# Patient Record
Sex: MALE | Race: WHITE | Employment: OTHER | URBAN - METROPOLITAN AREA
[De-identification: names, ages, dates, MRNs, and addresses within clinical notes are randomized per-mention and may not be internally consistent; named-entity substitution may affect disease eponyms.]

---

## 2017-10-27 ENCOUNTER — HOSPITAL ENCOUNTER (OUTPATIENT)
Dept: CT IMAGING | Age: 67
Discharge: HOME | End: 2017-10-27
Attending: ORTHOPAEDIC SURGERY
Payer: COMMERCIAL

## 2017-10-27 DIAGNOSIS — M17.12 DEGENERATIVE ARTHRITIS OF LEFT KNEE: ICD-10-CM

## 2017-10-27 PROCEDURE — 73700 CT LOWER EXTREMITY W/O DYE: CPT

## 2017-12-06 ENCOUNTER — HOSPITAL ENCOUNTER (OUTPATIENT)
Dept: PREADMISSION TESTING | Age: 67
Discharge: HOME | End: 2017-12-06
Payer: COMMERCIAL

## 2017-12-06 VITALS
RESPIRATION RATE: 17 BRPM | TEMPERATURE: 97.4 F | OXYGEN SATURATION: 96 % | HEIGHT: 70 IN | HEART RATE: 64 BPM | WEIGHT: 214.73 LBS | DIASTOLIC BLOOD PRESSURE: 79 MMHG | BODY MASS INDEX: 30.74 KG/M2 | SYSTOLIC BLOOD PRESSURE: 146 MMHG

## 2017-12-06 LAB
ALBUMIN SERPL-MCNC: 4.2 G/DL (ref 3.5–5)
ALBUMIN/GLOB SERPL: 1.4 {RATIO} (ref 1.1–2.2)
ALP SERPL-CCNC: 81 U/L (ref 45–117)
ALT SERPL-CCNC: 31 U/L (ref 12–78)
ANION GAP SERPL CALC-SCNC: 12 MMOL/L (ref 5–15)
APPEARANCE UR: CLEAR
AST SERPL-CCNC: 20 U/L (ref 15–37)
BACTERIA URNS QL MICRO: NEGATIVE /HPF
BASOPHILS # BLD: 0.1 K/UL (ref 0–0.1)
BASOPHILS NFR BLD: 1 % (ref 0–1)
BILIRUB SERPL-MCNC: 0.5 MG/DL (ref 0.2–1)
BILIRUB UR QL: NEGATIVE
BUN SERPL-MCNC: 18 MG/DL (ref 6–20)
BUN/CREAT SERPL: 15 (ref 12–20)
CALCIUM SERPL-MCNC: 10.3 MG/DL (ref 8.5–10.1)
CHLORIDE SERPL-SCNC: 106 MMOL/L (ref 97–108)
CO2 SERPL-SCNC: 27 MMOL/L (ref 21–32)
COLOR UR: NORMAL
CREAT SERPL-MCNC: 1.19 MG/DL (ref 0.7–1.3)
CRP SERPL-MCNC: 0.6 MG/DL
EOSINOPHIL # BLD: 0.2 K/UL (ref 0–0.4)
EOSINOPHIL NFR BLD: 3 % (ref 0–7)
EPITH CASTS URNS QL MICRO: NORMAL /LPF
ERYTHROCYTE [DISTWIDTH] IN BLOOD BY AUTOMATED COUNT: 12.9 % (ref 11.5–14.5)
ERYTHROCYTE [SEDIMENTATION RATE] IN BLOOD: 15 MM/HR (ref 0–20)
EST. AVERAGE GLUCOSE BLD GHB EST-MCNC: 128 MG/DL
GLOBULIN SER CALC-MCNC: 3 G/DL (ref 2–4)
GLUCOSE SERPL-MCNC: 95 MG/DL (ref 65–100)
GLUCOSE UR STRIP.AUTO-MCNC: NEGATIVE MG/DL
HBA1C MFR BLD: 6.1 % (ref 4.2–6.3)
HCT VFR BLD AUTO: 42.4 % (ref 36.6–50.3)
HGB BLD-MCNC: 14.8 G/DL (ref 12.1–17)
HGB UR QL STRIP: NEGATIVE
HYALINE CASTS URNS QL MICRO: NORMAL /LPF (ref 0–5)
KETONES UR QL STRIP.AUTO: NEGATIVE MG/DL
LEUKOCYTE ESTERASE UR QL STRIP.AUTO: NEGATIVE
LYMPHOCYTES # BLD: 1.6 K/UL (ref 0.8–3.5)
LYMPHOCYTES NFR BLD: 22 % (ref 12–49)
MCH RBC QN AUTO: 32.2 PG (ref 26–34)
MCHC RBC AUTO-ENTMCNC: 34.9 G/DL (ref 30–36.5)
MCV RBC AUTO: 92.2 FL (ref 80–99)
MONOCYTES # BLD: 0.7 K/UL (ref 0–1)
MONOCYTES NFR BLD: 9 % (ref 5–13)
NEUTS SEG # BLD: 4.6 K/UL (ref 1.8–8)
NEUTS SEG NFR BLD: 65 % (ref 32–75)
NITRITE UR QL STRIP.AUTO: NEGATIVE
PH UR STRIP: 5 [PH] (ref 5–8)
PLATELET # BLD AUTO: 286 K/UL (ref 150–400)
POTASSIUM SERPL-SCNC: 4.4 MMOL/L (ref 3.5–5.1)
PROT SERPL-MCNC: 7.2 G/DL (ref 6.4–8.2)
PROT UR STRIP-MCNC: NEGATIVE MG/DL
RBC # BLD AUTO: 4.6 M/UL (ref 4.1–5.7)
RBC #/AREA URNS HPF: NORMAL /HPF (ref 0–5)
SODIUM SERPL-SCNC: 145 MMOL/L (ref 136–145)
SP GR UR REFRACTOMETRY: 1.02 (ref 1–1.03)
UA: UC IF INDICATED,UAUC: NORMAL
UROBILINOGEN UR QL STRIP.AUTO: 0.2 EU/DL (ref 0.2–1)
WBC # BLD AUTO: 7.2 K/UL (ref 4.1–11.1)
WBC URNS QL MICRO: NORMAL /HPF (ref 0–4)

## 2017-12-06 PROCEDURE — 36415 COLL VENOUS BLD VENIPUNCTURE: CPT | Performed by: ORTHOPAEDIC SURGERY

## 2017-12-06 PROCEDURE — 85652 RBC SED RATE AUTOMATED: CPT | Performed by: ORTHOPAEDIC SURGERY

## 2017-12-06 PROCEDURE — 86140 C-REACTIVE PROTEIN: CPT | Performed by: ORTHOPAEDIC SURGERY

## 2017-12-06 PROCEDURE — 83036 HEMOGLOBIN GLYCOSYLATED A1C: CPT | Performed by: ORTHOPAEDIC SURGERY

## 2017-12-06 PROCEDURE — 81001 URINALYSIS AUTO W/SCOPE: CPT | Performed by: ORTHOPAEDIC SURGERY

## 2017-12-06 PROCEDURE — 93005 ELECTROCARDIOGRAM TRACING: CPT

## 2017-12-06 PROCEDURE — 85025 COMPLETE CBC W/AUTO DIFF WBC: CPT | Performed by: ORTHOPAEDIC SURGERY

## 2017-12-06 PROCEDURE — 80053 COMPREHEN METABOLIC PANEL: CPT | Performed by: ORTHOPAEDIC SURGERY

## 2017-12-06 PROCEDURE — 84466 ASSAY OF TRANSFERRIN: CPT | Performed by: ORTHOPAEDIC SURGERY

## 2017-12-06 RX ORDER — ASPIRIN 81 MG/1
81 TABLET ORAL DAILY
COMMUNITY
End: 2017-12-14

## 2017-12-06 RX ORDER — AMLODIPINE BESYLATE 10 MG/1
10 TABLET ORAL
COMMUNITY

## 2017-12-06 RX ORDER — SULINDAC 200 MG/1
200 TABLET ORAL 2 TIMES DAILY
COMMUNITY

## 2017-12-06 NOTE — PERIOP NOTES
[facility]  PREOPERATIVE INSTRUCTIONS    Surgery Date: Wednesday, 12/13/17. Surgery arrival time given by surgeon: NO  (If [facility] staff will call you between 3pm - 7pm the day before surgery with your arrival time. If your surgery is on a Monday, we will call you the preceding Friday. Please call [phone] after 7pm if you did not receive your arrival time.)   Verification phone call on Tuesday, 12/12/17. 1. Report  to the [address] [facility] on the day of your surgery. Bring your insurance card, photo identification, and any copayment (if applicable). 2. You must have a responsible adult to drive you home and stay with you the first 24 hours after surgery if you are going home the same day of your surgery. 3. Nothing to eat or drink after midnight the night before surgery. This means NO water, gum, mints, coffee, juice, etc.    4. MEDICATIONS TO TAKE THE MORNING OF SURGERY WITH A SIP OF WATER:Levothyroxine  And amlodipine. 5. No alcoholic beverages 24 hours before and after your surgery. 6. If you are being admitted to the hospital,please leave personal belongings/luggage in your car until you have an assigned hospital room number. ( The hospital discharge time is 12 PM NOON. Your adult  should be at the hospital prior to the noon discharge time unless otherwise instructed.)   7. STOP Aspirin and/or any non-steroidal anti-inflammatory drugs (i.e. Ibuprofen, Naproxen, Advil, Aleve) as directed by your surgeon. You may take Tylenol. Stop herbal supplements 1 week prior to  surgery. 8. If you are currently taking Plavix, Coumadin,or any other blood-thinning/ anticoagulant medication contact your surgeon for instructions. 9. Wear comfortable clothes. Wear your glasses instead of contacts. Please leave all money, jewelry and valuables at home. No make up, particularly mascara, the day of surgery. 10.  REMOVE ALL body piercings, rings,and jewelry and leave at home.   Wear your hair loose or down, no pony-tails, buns, or any metal hair clips. 11. If you shower the morning of surgery, please do not apply any lotions, powders, or deodorants afterwards. Do not shave any body area within 24 hours of your surgery. 12. Please follow all instructions to avoid any potential surgical cancellation. 13. Should your physical condition change, (i.e. fever, cold, flu, etc.) please notify your surgeon as soon as possible. 14. It is important to be on time. If a situation occurs where you may be delayed, please call:  [phone] / [phone] on the day of surgery. 15. The Preadmission Testing staff can be reached at [phone]. 16.  Special Instructions:  · Use Chlorhexidine Care Fusion wash and sponges 3 days prior to surgery as instructed. · Incentive spirometer given with instructions to practice at home and bring back to the hospital on the day of surgery. · [facility] will contact you if your Hemoglobin A1C is greater than 7.5. · Ensure/Glucerna  sample, nutritional information, and Ensure/Glucerna coupon given. · Pain pamphlet and Call Don't Fall reminder reviewed with patient. ·  parking is complimentary Monday - Friday 7 am - 5 pm  · Bring PTA Medication list day of surgery with the last doses taken documented   · Do not bring medication bottles the day of surgery  16. The patient was contacted  in person. He  verbalize  understanding of all instructions does not  need reinforcement.

## 2017-12-06 NOTE — PERIOP NOTES
Consent to be signed DOS . Attempted to clarify order but unsuccessful. Note on front of chart. DOS: 12/13/17.

## 2017-12-06 NOTE — PERIOP NOTES
Called Kelley Boles to clarify orders. Pt stated he was having a partial knee replacement but orders indicate full joint replacement. Left message for Kelley Boles to call back to clarify.

## 2017-12-06 NOTE — H&P
PAT Pre-Op History & Physical    Patient: Asim Farrar. MRN: [id number]          SSN: [id number]  YOB: 1950          Age: 79 y.o. Sex: male                Subjective:   Patient is a 79 y.o.  male who presents with history of chronic left knee pain that has been a problem for about 1 year per patient report. Rates his left knee pain 5/10 and describes it as an intermittent sharp, stabbing pain along medial aspect of his left knee. Walking any significant distance exacerbates his left knee pain. Sitting down relieves his pain. States that he likes to go to festival and other events and his left knee pain is keeping him from enjoying these activities. Had right TKA 10/2014 with good results. Has failed steroid joint injections, NSAIDs, Tramadol, PT, and application of ice. The patient was evaluated in the surgeon's office and it was determined that the most appropriate plan of care is to proceed with surgical intervention. Patient's PCP Rosetta Harris MD            Past Medical History:   Diagnosis Date    Arthritis     Hypercholesteremia     Hypertension     Ill-defined condition     hyperlipidemia    Ill-defined condition 12/06/2017    obesity  BMI= 30.8    Thyroid disease     Pt states he doesn't know why he is taking this medication. He stated he was never told that he had a problem. Taking this medication for 15 years. Past Surgical History:   Procedure Laterality Date    HX HEENT      eye surgery x2 -once in childhood, again in 2005    HX KNEE REPLACEMENT Right 10/2014    HX TONSILLECTOMY      child      Prior to Admission medications    Medication Sig Start Date End Date Taking? Authorizing Provider   aspirin delayed-release 81 mg tablet Take 81 mg by mouth daily. Yes Historical Provider   amLODIPine (NORVASC) 10 mg tablet Take 10 mg by mouth daily (with breakfast).    Yes Historical Provider   sulindac (CLINORIL) 200 mg tablet Take 200 mg by mouth two (2) times a day. Yes Historical Provider   tramadol (ULTRAM) 50 mg tablet Take 1 tablet by mouth every six (6) hours as needed for Pain. Patient taking differently: Take 50 mg by mouth every six (6) hours as needed for Pain. Indications: Pain 10/23/14  Yes Taya Calixto MD   losartan (COZAAR) 50 mg tablet Take 100 mg by mouth daily (after breakfast). Takes 2    50 mg tabs to = 100 mg dose. Yes Historical Provider   atorvastatin (LIPITOR) 80 mg tablet Take 80 mg by mouth Daily (before breakfast). Yes Historical Provider   levothyroxine (SYNTHROID) 125 mcg tablet Take 125 mcg by mouth Daily (before breakfast). Yes Historical Provider   ascorbic acid (VITAMIN C) 1,000 mg tablet Take 1,000 mg by mouth daily. Yes Historical Provider   multivitamins-minerals-lutein (SENIOR VITAMIN) tab tablet Take 1 Tab by mouth daily. Mature Adult  From [facility]    Historical Provider     Current Outpatient Prescriptions   Medication Sig    aspirin delayed-release 81 mg tablet Take 81 mg by mouth daily.  amLODIPine (NORVASC) 10 mg tablet Take 10 mg by mouth daily (with breakfast).  sulindac (CLINORIL) 200 mg tablet Take 200 mg by mouth two (2) times a day.  tramadol (ULTRAM) 50 mg tablet Take 1 tablet by mouth every six (6) hours as needed for Pain. (Patient taking differently: Take 50 mg by mouth every six (6) hours as needed for Pain. Indications: Pain)    losartan (COZAAR) 50 mg tablet Take 100 mg by mouth daily (after breakfast). Takes 2    50 mg tabs to = 100 mg dose.  atorvastatin (LIPITOR) 80 mg tablet Take 80 mg by mouth Daily (before breakfast).  levothyroxine (SYNTHROID) 125 mcg tablet Take 125 mcg by mouth Daily (before breakfast).  ascorbic acid (VITAMIN C) 1,000 mg tablet Take 1,000 mg by mouth daily.  multivitamins-minerals-lutein (SENIOR VITAMIN) tab tablet Take 1 Tab by mouth daily.  Mature Adult  From [facility]     No current facility-administered medications for this encounter. No Known Allergies   Social History   Substance Use Topics    Smoking status: Former Smoker     Packs/day: 2.00     Years: 12.00     Quit date: 1997    Smokeless tobacco: Never Used    Alcohol use 6.0 oz/week     6 Glasses of wine, 6 Cans of beer per week      Comment: weekly        History   Drug Use No     Family History   Problem Relation Age of Onset    Stroke Mother     Hypertension Mother     Alcohol abuse Mother     Alcohol abuse Father          Review of Systems    Patient denies difficulty swallowing, mouth sores, or loose teeth. Patient denies any recent dental procedures or any planned prior to surgery. Patient denies chest pain, tightness, pain radiating down left arm, palpitations. Denies dizziness, visual disturbances, or lightheadedness. Patient denies shortness of breath, wheezing, cough, fever, or chills. Patient denies diarrhea, constipation, or abdominal pain. Patient denies urinary problems including dysuria, hesitancy, urgency, or incontinence. Denies skin breakdown, rashes, insect bites or open area. C/o left knee pain. Objective:   Patient Vitals for the past 24 hrs:   Temp Pulse Resp BP SpO2   17 1520 97.4 °F (36.3 °C) 64 17 146/79 96 %     Temp (24hrs), Av.4 °F (36.3 °C), Min:97.4 °F (36.3 °C), Max:97.4 °F (36.3 °C)    Body mass index is 30.81 kg/(m^2). Wt Readings from Last 1 Encounters:   17 97.4 kg (214 lb 11.7 oz)        Physical Exam:     General: Pleasant,  cooperative, no apparent distress, appears stated age. Eyes: Conjunctivae/corneas clear. EOMs intact. Nose: Nares normal.   Mouth/Throat: Lips, mucosa, and tongue normal. Teeth and gums normal.   Neck: Supple, symmetrical, trachea midline. Back: Symmetric   Lungs: Clear to auscultation bilaterally. Heart: Regular rate and rhythm, S1, S2 normal. No murmur, click, rub or gallop. Abdomen: Soft, non-tender. Bowel sounds normal. No distention.    Musculoskeletal: Gait is antalgic. Extremities:  Extremities normal, atraumatic, no cyanosis or edema. Calves                                 supple, non tender to palpation. Pulses: 2+ and symmetric bilateral upper extremities. Cap. refill <2 seconds   Skin: Skin color, texture, turgor normal.  No rashes or lesions. Neurologic: CN II-XII grossly intact. Alert and oriented x3. Labs:   Recent Results (from the past 72 hour(s))   C REACTIVE PROTEIN, QT    Collection Time: 12/06/17  4:05 PM   Result Value Ref Range    C-Reactive protein 0.60 (H) <0.60 mg/dL   CBC WITH AUTOMATED DIFF    Collection Time: 12/06/17  4:05 PM   Result Value Ref Range    WBC 7.2 4.1 - 11.1 K/uL    RBC 4.60 4.10 - 5.70 M/uL    HGB 14.8 12.1 - 17.0 g/dL    HCT 42.4 36.6 - 50.3 %    MCV 92.2 80.0 - 99.0 FL    MCH 32.2 26.0 - 34.0 PG    MCHC 34.9 30.0 - 36.5 g/dL    RDW 12.9 11.5 - 14.5 %    PLATELET 940 723 - 878 K/uL    NEUTROPHILS 65 32 - 75 %    LYMPHOCYTES 22 12 - 49 %    MONOCYTES 9 5 - 13 %    EOSINOPHILS 3 0 - 7 %    BASOPHILS 1 0 - 1 %    ABS. NEUTROPHILS 4.6 1.8 - 8.0 K/UL    ABS. LYMPHOCYTES 1.6 0.8 - 3.5 K/UL    ABS. MONOCYTES 0.7 0.0 - 1.0 K/UL    ABS. EOSINOPHILS 0.2 0.0 - 0.4 K/UL    ABS. BASOPHILS 0.1 0.0 - 0.1 K/UL   METABOLIC PANEL, COMPREHENSIVE    Collection Time: 12/06/17  4:05 PM   Result Value Ref Range    Sodium 145 136 - 145 mmol/L    Potassium 4.4 3.5 - 5.1 mmol/L    Chloride 106 97 - 108 mmol/L    CO2 27 21 - 32 mmol/L    Anion gap 12 5 - 15 mmol/L    Glucose 95 65 - 100 mg/dL    BUN 18 6 - 20 MG/DL    Creatinine 1.19 0.70 - 1.30 MG/DL    BUN/Creatinine ratio 15 12 - 20      GFR est AA >60 >60 ml/min/1.73m2    GFR est non-AA >60 >60 ml/min/1.73m2    Calcium 10.3 (H) 8.5 - 10.1 MG/DL    Bilirubin, total 0.5 0.2 - 1.0 MG/DL    ALT (SGPT) 31 12 - 78 U/L    AST (SGOT) 20 15 - 37 U/L    Alk.  phosphatase 81 45 - 117 U/L    Protein, total 7.2 6.4 - 8.2 g/dL    Albumin 4.2 3.5 - 5.0 g/dL    Globulin 3.0 2.0 - 4.0 g/dL    A-G Ratio 1.4 1.1 - 2.2     HEMOGLOBIN A1C WITH EAG    Collection Time: 12/06/17  4:05 PM   Result Value Ref Range    Hemoglobin A1c 6.1 4.2 - 6.3 %    Est. average glucose 128 mg/dL   SED RATE (ESR)    Collection Time: 12/06/17  4:05 PM   Result Value Ref Range    Sed rate, automated 15 0 - 20 mm/hr   URINALYSIS W/ REFLEX CULTURE    Collection Time: 12/06/17  4:05 PM   Result Value Ref Range    Color YELLOW/STRAW      Appearance CLEAR CLEAR      Specific gravity 1.018 1.003 - 1.030      pH (UA) 5.0 5.0 - 8.0      Protein NEGATIVE  NEG mg/dL    Glucose NEGATIVE  NEG mg/dL    Ketone NEGATIVE  NEG mg/dL    Bilirubin NEGATIVE  NEG      Blood NEGATIVE  NEG      Urobilinogen 0.2 0.2 - 1.0 EU/dL    Nitrites NEGATIVE  NEG      Leukocyte Esterase NEGATIVE  NEG      WBC 0-4 0 - 4 /hpf    RBC 0-5 0 - 5 /hpf    Epithelial cells FEW FEW /lpf    Bacteria NEGATIVE  NEG /hpf    UA:UC IF INDICATED CULTURE NOT INDICATED BY UA RESULT CNI      Hyaline cast 2-5 0 - 5 /lpf       Assessment:     Left knee pain. Plan:     Scheduled for knee unicondylar replacement robotic assisted. Labs and EKG done per surgeon's orders. Labs reviewed- unremarkable except CRP= 0.60. MRSA and transferrin pending. Patient attended joint class prior to 10/2014 right TKA.         Kory Paredes NP

## 2017-12-07 LAB
ATRIAL RATE: 61 BPM
BACTERIA SPEC CULT: NORMAL
BACTERIA SPEC CULT: NORMAL
CALCULATED P AXIS, ECG09: 73 DEGREES
CALCULATED R AXIS, ECG10: 39 DEGREES
CALCULATED T AXIS, ECG11: 59 DEGREES
DIAGNOSIS, 93000: NORMAL
P-R INTERVAL, ECG05: 144 MS
Q-T INTERVAL, ECG07: 420 MS
QRS DURATION, ECG06: 96 MS
QTC CALCULATION (BEZET), ECG08: 422 MS
SERVICE CMNT-IMP: NORMAL
VENTRICULAR RATE, ECG03: 61 BPM

## 2017-12-08 LAB — TRANSFERRIN SERPL-MCNC: 268 MG/DL (ref 200–370)

## 2017-12-12 ENCOUNTER — ANESTHESIA EVENT (OUTPATIENT)
Dept: SURGERY | Age: 67
DRG: 470 | End: 2017-12-12
Payer: COMMERCIAL

## 2017-12-12 PROBLEM — M17.12 PRIMARY OSTEOARTHRITIS OF LEFT KNEE: Status: ACTIVE | Noted: 2017-12-12

## 2017-12-12 NOTE — PERIOP NOTES
Still no clarification has been obtained regarding if the patient will be undergoing a left total knee arthroplasty or a partial knee arthroplasty. Left a VM for [facility] requesting clarification of this. Also requested if the patient is to have a partial arthroplasty, that the orders be faxed to the [facility]'s fax number.   DOS: 12/13/2017

## 2017-12-13 ENCOUNTER — ANESTHESIA (OUTPATIENT)
Dept: SURGERY | Age: 67
DRG: 470 | End: 2017-12-13
Payer: COMMERCIAL

## 2017-12-13 ENCOUNTER — HOSPITAL ENCOUNTER (INPATIENT)
Age: 67
LOS: 1 days | Discharge: HOME HEALTH | DRG: 470 | End: 2017-12-14
Attending: ORTHOPAEDIC SURGERY | Admitting: ORTHOPAEDIC SURGERY
Payer: COMMERCIAL

## 2017-12-13 ENCOUNTER — APPOINTMENT (OUTPATIENT)
Dept: GENERAL RADIOLOGY | Age: 67
DRG: 470 | End: 2017-12-13
Attending: PHYSICIAN ASSISTANT
Payer: COMMERCIAL

## 2017-12-13 PROBLEM — Z96.659 S/P KNEE REPLACEMENT: Status: ACTIVE | Noted: 2017-12-13

## 2017-12-13 PROCEDURE — 77030020256 HC SOL INJ NACL 0.9%  500ML: Performed by: ORTHOPAEDIC SURGERY

## 2017-12-13 PROCEDURE — 76060000063 HC AMB SURG ANES 1.5 TO 2 HR: Performed by: ORTHOPAEDIC SURGERY

## 2017-12-13 PROCEDURE — 77030028224 HC PDNG CST BSNM -A: Performed by: ORTHOPAEDIC SURGERY

## 2017-12-13 PROCEDURE — 0SRD0L9 REPLACEMENT OF LEFT KNEE JOINT WITH MEDIAL UNICONDYLAR SYNTHETIC SUBSTITUTE, CEMENTED, OPEN APPROACH: ICD-10-PCS | Performed by: ORTHOPAEDIC SURGERY

## 2017-12-13 PROCEDURE — 64447 NJX AA&/STRD FEMORAL NRV IMG: CPT

## 2017-12-13 PROCEDURE — C1776 JOINT DEVICE (IMPLANTABLE): HCPCS | Performed by: ORTHOPAEDIC SURGERY

## 2017-12-13 PROCEDURE — 77030011640 HC PAD GRND REM COVD -A: Performed by: ORTHOPAEDIC SURGERY

## 2017-12-13 PROCEDURE — 77030018836 HC SOL IRR NACL ICUM -A: Performed by: ORTHOPAEDIC SURGERY

## 2017-12-13 PROCEDURE — 74011250637 HC RX REV CODE- 250/637: Performed by: ANESTHESIOLOGY

## 2017-12-13 PROCEDURE — 74011250637 HC RX REV CODE- 250/637: Performed by: PHYSICIAN ASSISTANT

## 2017-12-13 PROCEDURE — 8E0Y0CZ ROBOTIC ASSISTED PROCEDURE OF LOWER EXTREMITY, OPEN APPROACH: ICD-10-PCS | Performed by: ORTHOPAEDIC SURGERY

## 2017-12-13 PROCEDURE — 74011250636 HC RX REV CODE- 250/636: Performed by: PHYSICIAN ASSISTANT

## 2017-12-13 PROCEDURE — 64445 NJX AA&/STRD SCIATIC NRV IMG: CPT

## 2017-12-13 PROCEDURE — 74011250636 HC RX REV CODE- 250/636

## 2017-12-13 PROCEDURE — C1713 ANCHOR/SCREW BN/BN,TIS/BN: HCPCS | Performed by: ORTHOPAEDIC SURGERY

## 2017-12-13 PROCEDURE — 77030038021 HC TBNG IRR EMAX2 STRY -C: Performed by: ORTHOPAEDIC SURGERY

## 2017-12-13 PROCEDURE — 77030007866 HC KT SPN ANES BBMI -B

## 2017-12-13 PROCEDURE — 77030020263 HC SOL INJ SOD CL0.9% LFCR 1000ML: Performed by: ORTHOPAEDIC SURGERY

## 2017-12-13 PROCEDURE — 74011000250 HC RX REV CODE- 250

## 2017-12-13 PROCEDURE — 77030032490 HC SLV COMPR SCD KNE COVD -B: Performed by: ORTHOPAEDIC SURGERY

## 2017-12-13 PROCEDURE — 77030031139 HC SUT VCRL2 J&J -A: Performed by: ORTHOPAEDIC SURGERY

## 2017-12-13 PROCEDURE — 77030002933 HC SUT MCRYL J&J -A: Performed by: ORTHOPAEDIC SURGERY

## 2017-12-13 PROCEDURE — 77030003601 HC NDL NRV BLK BBMI -A

## 2017-12-13 PROCEDURE — 73560 X-RAY EXAM OF KNEE 1 OR 2: CPT

## 2017-12-13 PROCEDURE — 77030029820: Performed by: ORTHOPAEDIC SURGERY

## 2017-12-13 PROCEDURE — 77030018673: Performed by: ORTHOPAEDIC SURGERY

## 2017-12-13 PROCEDURE — 77030020782 HC GWN BAIR PAWS FLX 3M -B

## 2017-12-13 PROCEDURE — 74011250636 HC RX REV CODE- 250/636: Performed by: ANESTHESIOLOGY

## 2017-12-13 PROCEDURE — 77030010785: Performed by: ORTHOPAEDIC SURGERY

## 2017-12-13 PROCEDURE — 76030000047: Performed by: ORTHOPAEDIC SURGERY

## 2017-12-13 PROCEDURE — 77030000032 HC CUF TRNQT ZIMM -B: Performed by: ORTHOPAEDIC SURGERY

## 2017-12-13 PROCEDURE — 74011000258 HC RX REV CODE- 258

## 2017-12-13 PROCEDURE — 77030018822 HC SLV COMPR FT COVD -B

## 2017-12-13 PROCEDURE — 74011000272 HC RX REV CODE- 272: Performed by: ORTHOPAEDIC SURGERY

## 2017-12-13 PROCEDURE — 77030029828 HC FEM TIB CKPNT KT DISP STRY -B: Performed by: ORTHOPAEDIC SURGERY

## 2017-12-13 PROCEDURE — 65270000029 HC RM PRIVATE

## 2017-12-13 PROCEDURE — 77030033067 HC SUT PDO STRATFX SPIR J&J -B: Performed by: ORTHOPAEDIC SURGERY

## 2017-12-13 PROCEDURE — 77030038024: Performed by: ORTHOPAEDIC SURGERY

## 2017-12-13 PROCEDURE — 77030028907 HC WRP KNEE WO BGS SOLM -B

## 2017-12-13 PROCEDURE — 76210000035 HC AMBSU PH I REC 1 TO 1.5 HR: Performed by: ORTHOPAEDIC SURGERY

## 2017-12-13 PROCEDURE — 77030013708 HC HNDPC SUC IRR PULS STRY –B: Performed by: ORTHOPAEDIC SURGERY

## 2017-12-13 PROCEDURE — 74011000250 HC RX REV CODE- 250: Performed by: ORTHOPAEDIC SURGERY

## 2017-12-13 PROCEDURE — 77030010507 HC ADH SKN DERMBND J&J -B: Performed by: ORTHOPAEDIC SURGERY

## 2017-12-13 PROCEDURE — 77030011992 HC AIRWY NASOPHGL TELE -A: Performed by: ANESTHESIOLOGY

## 2017-12-13 PROCEDURE — 77030004392 HC BUR FLUT STRY -B: Performed by: ORTHOPAEDIC SURGERY

## 2017-12-13 PROCEDURE — 76030000020 HC AMB SURG 1.5 TO 2 HR INTENSV-TIER 1: Performed by: ORTHOPAEDIC SURGERY

## 2017-12-13 DEVICE — COMPONENT KNEE ONLAY 12 UNI: Type: IMPLANTABLE DEVICE | Site: KNEE | Status: FUNCTIONAL

## 2017-12-13 DEVICE — TIBIAL BEARING INSERT
Type: IMPLANTABLE DEVICE | Site: KNEE | Status: FUNCTIONAL
Brand: MAKO

## 2017-12-13 DEVICE — MCK FEMORAL COMPONENT
Type: IMPLANTABLE DEVICE | Site: KNEE | Status: FUNCTIONAL
Brand: RESTORIS

## 2017-12-13 DEVICE — CEMENT BNE SIMPLEX W/O GENT -- PK/10 ONLY: Type: IMPLANTABLE DEVICE | Site: KNEE | Status: FUNCTIONAL

## 2017-12-13 DEVICE — MCK ONLAY TIBIA BASEPLATE
Type: IMPLANTABLE DEVICE | Site: KNEE | Status: FUNCTIONAL
Brand: RESTORIS

## 2017-12-13 RX ORDER — MIDAZOLAM HYDROCHLORIDE 1 MG/ML
INJECTION, SOLUTION INTRAMUSCULAR; INTRAVENOUS AS NEEDED
Status: DISCONTINUED | OUTPATIENT
Start: 2017-12-13 | End: 2017-12-13 | Stop reason: HOSPADM

## 2017-12-13 RX ORDER — OXYCODONE HCL 10 MG/1
10 TABLET, FILM COATED, EXTENDED RELEASE ORAL EVERY 12 HOURS
Status: DISCONTINUED | OUTPATIENT
Start: 2017-12-13 | End: 2017-12-13

## 2017-12-13 RX ORDER — CEFAZOLIN SODIUM IN 0.9 % NACL 2 G/50 ML
2 INTRAVENOUS SOLUTION, PIGGYBACK (ML) INTRAVENOUS EVERY 8 HOURS
Status: COMPLETED | OUTPATIENT
Start: 2017-12-13 | End: 2017-12-14

## 2017-12-13 RX ORDER — POVIDONE-IODINE 10 %
SOLUTION, NON-ORAL TOPICAL AS NEEDED
Status: DISCONTINUED | OUTPATIENT
Start: 2017-12-13 | End: 2017-12-13 | Stop reason: HOSPADM

## 2017-12-13 RX ORDER — ONDANSETRON 8 MG/1
4 TABLET, ORALLY DISINTEGRATING ORAL
Qty: 30 TAB | Refills: 0 | Status: SHIPPED | OUTPATIENT
Start: 2017-12-13

## 2017-12-13 RX ORDER — SODIUM CHLORIDE 0.9 % (FLUSH) 0.9 %
5-10 SYRINGE (ML) INJECTION AS NEEDED
Status: DISCONTINUED | OUTPATIENT
Start: 2017-12-13 | End: 2017-12-14 | Stop reason: HOSPADM

## 2017-12-13 RX ORDER — SODIUM CHLORIDE, SODIUM LACTATE, POTASSIUM CHLORIDE, CALCIUM CHLORIDE 600; 310; 30; 20 MG/100ML; MG/100ML; MG/100ML; MG/100ML
150 INJECTION, SOLUTION INTRAVENOUS CONTINUOUS
Status: DISCONTINUED | OUTPATIENT
Start: 2017-12-13 | End: 2017-12-13 | Stop reason: HOSPADM

## 2017-12-13 RX ORDER — AMOXICILLIN 250 MG
1 CAPSULE ORAL 2 TIMES DAILY
Status: DISCONTINUED | OUTPATIENT
Start: 2017-12-13 | End: 2017-12-14 | Stop reason: HOSPADM

## 2017-12-13 RX ORDER — ACETAMINOPHEN 325 MG/1
975 TABLET ORAL ONCE
Status: COMPLETED | OUTPATIENT
Start: 2017-12-13 | End: 2017-12-13

## 2017-12-13 RX ORDER — TRAMADOL HYDROCHLORIDE 50 MG/1
50 TABLET ORAL
Qty: 60 TAB | Refills: 0 | Status: SHIPPED | OUTPATIENT
Start: 2017-12-13

## 2017-12-13 RX ORDER — FAMOTIDINE 20 MG/1
20 TABLET, FILM COATED ORAL 2 TIMES DAILY
Status: DISCONTINUED | OUTPATIENT
Start: 2017-12-13 | End: 2017-12-14 | Stop reason: HOSPADM

## 2017-12-13 RX ORDER — FENTANYL CITRATE 50 UG/ML
INJECTION, SOLUTION INTRAMUSCULAR; INTRAVENOUS AS NEEDED
Status: DISCONTINUED | OUTPATIENT
Start: 2017-12-13 | End: 2017-12-13 | Stop reason: HOSPADM

## 2017-12-13 RX ORDER — CELECOXIB 100 MG/1
200 CAPSULE ORAL 2 TIMES DAILY
Status: DISCONTINUED | OUTPATIENT
Start: 2017-12-13 | End: 2017-12-14 | Stop reason: HOSPADM

## 2017-12-13 RX ORDER — ONDANSETRON 2 MG/ML
4 INJECTION INTRAMUSCULAR; INTRAVENOUS AS NEEDED
Status: DISCONTINUED | OUTPATIENT
Start: 2017-12-13 | End: 2017-12-13 | Stop reason: ALTCHOICE

## 2017-12-13 RX ORDER — ACETAMINOPHEN 325 MG/1
650 TABLET ORAL EVERY 6 HOURS
Status: DISCONTINUED | OUTPATIENT
Start: 2017-12-13 | End: 2017-12-13 | Stop reason: SDUPTHER

## 2017-12-13 RX ORDER — ATORVASTATIN CALCIUM 20 MG/1
80 TABLET, FILM COATED ORAL
Status: DISCONTINUED | OUTPATIENT
Start: 2017-12-14 | End: 2017-12-14 | Stop reason: HOSPADM

## 2017-12-13 RX ORDER — CEFAZOLIN SODIUM IN 0.9 % NACL 2 G/50 ML
2 INTRAVENOUS SOLUTION, PIGGYBACK (ML) INTRAVENOUS ONCE
Status: COMPLETED | OUTPATIENT
Start: 2017-12-13 | End: 2017-12-13

## 2017-12-13 RX ORDER — AMLODIPINE BESYLATE 5 MG/1
10 TABLET ORAL
Status: DISCONTINUED | OUTPATIENT
Start: 2017-12-14 | End: 2017-12-14 | Stop reason: HOSPADM

## 2017-12-13 RX ORDER — LOSARTAN POTASSIUM 50 MG/1
100 TABLET ORAL
Status: DISCONTINUED | OUTPATIENT
Start: 2017-12-14 | End: 2017-12-14 | Stop reason: HOSPADM

## 2017-12-13 RX ORDER — ACETAMINOPHEN 325 MG/1
650 TABLET ORAL EVERY 6 HOURS
Status: DISCONTINUED | OUTPATIENT
Start: 2017-12-13 | End: 2017-12-14 | Stop reason: HOSPADM

## 2017-12-13 RX ORDER — PROPOFOL 10 MG/ML
INJECTION, EMULSION INTRAVENOUS
Status: DISCONTINUED | OUTPATIENT
Start: 2017-12-13 | End: 2017-12-13 | Stop reason: HOSPADM

## 2017-12-13 RX ORDER — BUPIVACAINE HYDROCHLORIDE 7.5 MG/ML
INJECTION, SOLUTION EPIDURAL; RETROBULBAR AS NEEDED
Status: DISCONTINUED | OUTPATIENT
Start: 2017-12-13 | End: 2017-12-13 | Stop reason: HOSPADM

## 2017-12-13 RX ORDER — OXYCODONE HYDROCHLORIDE 5 MG/1
5 TABLET ORAL
Status: DISCONTINUED | OUTPATIENT
Start: 2017-12-13 | End: 2017-12-14 | Stop reason: HOSPADM

## 2017-12-13 RX ORDER — ACETAMINOPHEN 500 MG
500-1000 TABLET ORAL
Qty: 60 TAB | Refills: 0 | Status: SHIPPED | OUTPATIENT
Start: 2017-12-13

## 2017-12-13 RX ORDER — HYDROMORPHONE HYDROCHLORIDE 1 MG/ML
0.5 INJECTION, SOLUTION INTRAMUSCULAR; INTRAVENOUS; SUBCUTANEOUS
Status: DISCONTINUED | OUTPATIENT
Start: 2017-12-13 | End: 2017-12-13 | Stop reason: ALTCHOICE

## 2017-12-13 RX ORDER — ASPIRIN 325 MG
325 TABLET, DELAYED RELEASE (ENTERIC COATED) ORAL 2 TIMES DAILY
Qty: 60 TAB | Refills: 0 | Status: SHIPPED | OUTPATIENT
Start: 2017-12-13

## 2017-12-13 RX ORDER — LIDOCAINE HYDROCHLORIDE 10 MG/ML
0.1 INJECTION, SOLUTION EPIDURAL; INFILTRATION; INTRACAUDAL; PERINEURAL AS NEEDED
Status: DISCONTINUED | OUTPATIENT
Start: 2017-12-13 | End: 2017-12-13 | Stop reason: HOSPADM

## 2017-12-13 RX ORDER — POLYETHYLENE GLYCOL 3350 17 G/17G
17 POWDER, FOR SOLUTION ORAL DAILY
Status: DISCONTINUED | OUTPATIENT
Start: 2017-12-14 | End: 2017-12-14 | Stop reason: HOSPADM

## 2017-12-13 RX ORDER — EPHEDRINE SULFATE 50 MG/ML
INJECTION, SOLUTION INTRAVENOUS AS NEEDED
Status: DISCONTINUED | OUTPATIENT
Start: 2017-12-13 | End: 2017-12-13 | Stop reason: HOSPADM

## 2017-12-13 RX ORDER — LIDOCAINE HYDROCHLORIDE 20 MG/ML
INJECTION, SOLUTION INFILTRATION; PERINEURAL AS NEEDED
Status: DISCONTINUED | OUTPATIENT
Start: 2017-12-13 | End: 2017-12-13 | Stop reason: HOSPADM

## 2017-12-13 RX ORDER — HYDROMORPHONE HYDROCHLORIDE 1 MG/ML
0.5 INJECTION, SOLUTION INTRAMUSCULAR; INTRAVENOUS; SUBCUTANEOUS
Status: DISCONTINUED | OUTPATIENT
Start: 2017-12-13 | End: 2017-12-14 | Stop reason: HOSPADM

## 2017-12-13 RX ORDER — OXYCODONE HYDROCHLORIDE 5 MG/1
10 TABLET ORAL
Status: DISCONTINUED | OUTPATIENT
Start: 2017-12-13 | End: 2017-12-14 | Stop reason: HOSPADM

## 2017-12-13 RX ORDER — DIPHENHYDRAMINE HYDROCHLORIDE 50 MG/ML
12.5 INJECTION, SOLUTION INTRAMUSCULAR; INTRAVENOUS AS NEEDED
Status: DISCONTINUED | OUTPATIENT
Start: 2017-12-13 | End: 2017-12-13 | Stop reason: ALTCHOICE

## 2017-12-13 RX ORDER — DIPHENHYDRAMINE HYDROCHLORIDE 50 MG/ML
12.5 INJECTION, SOLUTION INTRAMUSCULAR; INTRAVENOUS
Status: DISCONTINUED | OUTPATIENT
Start: 2017-12-13 | End: 2017-12-14 | Stop reason: HOSPADM

## 2017-12-13 RX ORDER — ASPIRIN 325 MG
325 TABLET, DELAYED RELEASE (ENTERIC COATED) ORAL 2 TIMES DAILY
Status: DISCONTINUED | OUTPATIENT
Start: 2017-12-13 | End: 2017-12-14 | Stop reason: HOSPADM

## 2017-12-13 RX ORDER — NALOXONE HYDROCHLORIDE 0.4 MG/ML
0.4 INJECTION, SOLUTION INTRAMUSCULAR; INTRAVENOUS; SUBCUTANEOUS AS NEEDED
Status: DISCONTINUED | OUTPATIENT
Start: 2017-12-13 | End: 2017-12-14 | Stop reason: HOSPADM

## 2017-12-13 RX ORDER — ALBUTEROL SULFATE 0.83 MG/ML
2.5 SOLUTION RESPIRATORY (INHALATION) AS NEEDED
Status: DISCONTINUED | OUTPATIENT
Start: 2017-12-13 | End: 2017-12-13 | Stop reason: ALTCHOICE

## 2017-12-13 RX ORDER — SODIUM CHLORIDE 9 MG/ML
125 INJECTION, SOLUTION INTRAVENOUS CONTINUOUS
Status: DISCONTINUED | OUTPATIENT
Start: 2017-12-13 | End: 2017-12-14 | Stop reason: HOSPADM

## 2017-12-13 RX ORDER — ONDANSETRON 2 MG/ML
4 INJECTION INTRAMUSCULAR; INTRAVENOUS
Status: DISCONTINUED | OUTPATIENT
Start: 2017-12-13 | End: 2017-12-14 | Stop reason: HOSPADM

## 2017-12-13 RX ORDER — SODIUM CHLORIDE 0.9 % (FLUSH) 0.9 %
5-10 SYRINGE (ML) INJECTION EVERY 8 HOURS
Status: DISCONTINUED | OUTPATIENT
Start: 2017-12-14 | End: 2017-12-14 | Stop reason: HOSPADM

## 2017-12-13 RX ORDER — PREGABALIN 75 MG/1
75 CAPSULE ORAL ONCE
Status: COMPLETED | OUTPATIENT
Start: 2017-12-13 | End: 2017-12-13

## 2017-12-13 RX ORDER — LEVOTHYROXINE SODIUM 125 UG/1
125 TABLET ORAL
Status: DISCONTINUED | OUTPATIENT
Start: 2017-12-14 | End: 2017-12-14 | Stop reason: HOSPADM

## 2017-12-13 RX ORDER — ROPIVACAINE HYDROCHLORIDE 2 MG/ML
INJECTION, SOLUTION EPIDURAL; INFILTRATION; PERINEURAL AS NEEDED
Status: DISCONTINUED | OUTPATIENT
Start: 2017-12-13 | End: 2017-12-13 | Stop reason: HOSPADM

## 2017-12-13 RX ORDER — FACIAL-BODY WIPES
10 EACH TOPICAL DAILY PRN
Status: DISCONTINUED | OUTPATIENT
Start: 2017-12-15 | End: 2017-12-14 | Stop reason: HOSPADM

## 2017-12-13 RX ORDER — OXYCODONE HYDROCHLORIDE 5 MG/1
5-10 TABLET ORAL
Qty: 70 TAB | Refills: 0 | Status: SHIPPED | OUTPATIENT
Start: 2017-12-13 | End: 2018-01-18

## 2017-12-13 RX ORDER — SODIUM CHLORIDE, SODIUM LACTATE, POTASSIUM CHLORIDE, CALCIUM CHLORIDE 600; 310; 30; 20 MG/100ML; MG/100ML; MG/100ML; MG/100ML
125 INJECTION, SOLUTION INTRAVENOUS CONTINUOUS
Status: DISCONTINUED | OUTPATIENT
Start: 2017-12-13 | End: 2017-12-13 | Stop reason: ALTCHOICE

## 2017-12-13 RX ADMIN — EPHEDRINE SULFATE 10 MG: 50 INJECTION, SOLUTION INTRAVENOUS at 13:34

## 2017-12-13 RX ADMIN — FAMOTIDINE 20 MG: 20 TABLET, FILM COATED ORAL at 18:16

## 2017-12-13 RX ADMIN — DOCUSATE SODIUM AND SENNOSIDES 1 TABLET: 8.6; 5 TABLET, FILM COATED ORAL at 18:15

## 2017-12-13 RX ADMIN — PROPOFOL 75 MCG/KG/MIN: 10 INJECTION, EMULSION INTRAVENOUS at 12:49

## 2017-12-13 RX ADMIN — BUPIVACAINE HYDROCHLORIDE 2.8 ML: 7.5 INJECTION, SOLUTION EPIDURAL; RETROBULBAR at 12:31

## 2017-12-13 RX ADMIN — MIDAZOLAM HYDROCHLORIDE 2 MG: 1 INJECTION, SOLUTION INTRAMUSCULAR; INTRAVENOUS at 12:43

## 2017-12-13 RX ADMIN — MIDAZOLAM HYDROCHLORIDE 1 MG: 1 INJECTION, SOLUTION INTRAMUSCULAR; INTRAVENOUS at 12:20

## 2017-12-13 RX ADMIN — OXYCODONE HYDROCHLORIDE 10 MG: 10 TABLET, FILM COATED, EXTENDED RELEASE ORAL at 11:04

## 2017-12-13 RX ADMIN — HYDROMORPHONE HYDROCHLORIDE 0.5 MG: 1 INJECTION, SOLUTION INTRAMUSCULAR; INTRAVENOUS; SUBCUTANEOUS at 15:47

## 2017-12-13 RX ADMIN — CELECOXIB 200 MG: 100 CAPSULE ORAL at 18:16

## 2017-12-13 RX ADMIN — MIDAZOLAM HYDROCHLORIDE 2 MG: 1 INJECTION, SOLUTION INTRAMUSCULAR; INTRAVENOUS at 12:17

## 2017-12-13 RX ADMIN — ACETAMINOPHEN 650 MG: 325 TABLET ORAL at 18:17

## 2017-12-13 RX ADMIN — SODIUM CHLORIDE, SODIUM LACTATE, POTASSIUM CHLORIDE, AND CALCIUM CHLORIDE: 600; 310; 30; 20 INJECTION, SOLUTION INTRAVENOUS at 13:34

## 2017-12-13 RX ADMIN — PREGABALIN 75 MG: 75 CAPSULE ORAL at 11:04

## 2017-12-13 RX ADMIN — ROPIVACAINE HYDROCHLORIDE 20 ML: 2 INJECTION, SOLUTION EPIDURAL; INFILTRATION; PERINEURAL at 12:24

## 2017-12-13 RX ADMIN — FENTANYL CITRATE 50 MCG: 50 INJECTION, SOLUTION INTRAMUSCULAR; INTRAVENOUS at 12:43

## 2017-12-13 RX ADMIN — ASPIRIN 325 MG: 325 TABLET, DELAYED RELEASE ORAL at 18:16

## 2017-12-13 RX ADMIN — CEFAZOLIN 2 G: 1 INJECTION, POWDER, FOR SOLUTION INTRAMUSCULAR; INTRAVENOUS; PARENTERAL at 20:22

## 2017-12-13 RX ADMIN — SODIUM CHLORIDE, SODIUM LACTATE, POTASSIUM CHLORIDE, AND CALCIUM CHLORIDE 150 ML/HR: 600; 310; 30; 20 INJECTION, SOLUTION INTRAVENOUS at 11:41

## 2017-12-13 RX ADMIN — LIDOCAINE HYDROCHLORIDE 40 MG: 20 INJECTION, SOLUTION INFILTRATION; PERINEURAL at 12:48

## 2017-12-13 RX ADMIN — ROPIVACAINE HYDROCHLORIDE 30 ML: 2 INJECTION, SOLUTION EPIDURAL; INFILTRATION; PERINEURAL at 12:21

## 2017-12-13 RX ADMIN — FENTANYL CITRATE 50 MCG: 50 INJECTION, SOLUTION INTRAMUSCULAR; INTRAVENOUS at 12:17

## 2017-12-13 RX ADMIN — SODIUM CHLORIDE 125 ML/HR: 900 INJECTION, SOLUTION INTRAVENOUS at 15:52

## 2017-12-13 RX ADMIN — ACETAMINOPHEN 975 MG: 325 TABLET ORAL at 11:04

## 2017-12-13 RX ADMIN — CEFAZOLIN 2 G: 1 INJECTION, POWDER, FOR SOLUTION INTRAMUSCULAR; INTRAVENOUS; PARENTERAL at 12:44

## 2017-12-13 NOTE — INTERVAL H&P NOTE
H&P Update:  Giovani Ciro was seen and examined. History and physical has been reviewed. The patient has been examined.  There have been no significant clinical changes since the completion of the originally dated History and Physical.    Signed By: Isacc Rodgers MD     December 13, 2017 12:47 PM

## 2017-12-13 NOTE — PROGRESS NOTES
Pt with very little B LE movement after spinal anaesthesia. Pt will be evaluated in the morning. Pt instructed in ankle pumps (when able) and handout provided.

## 2017-12-13 NOTE — IP AVS SNAPSHOT
[facility] 
 
 
 [address] 
[phone] Patient: Violetta Parrish. MRN: [id number] AOA:[id number] About your hospitalization You were admitted on:  December 13, 2017 You last received care in the:  [facility] 4M POST SURG ORT 2 You were discharged on:  December 14, 2017 Why you were hospitalized Your primary diagnosis was:  Primary Osteoarthritis Of Left Knee Your diagnoses also included:  S/P Knee Replacement Things You Need To Do (next 8 weeks) Follow up with [facility] Phone:  [phone] Where:  [facility] [address] [place] [id number] Follow up with PROVIDER UNKNOWN Where:  Patient not available to ask Discharge Orders None A check neva indicates which time of day the medication should be taken. My Medications TAKE these medications as instructed Instructions Each Dose to Equal  
 Morning Noon Evening Bedtime  
 acetaminophen 500 mg tablet Commonly known as:  80 Jr Deidre Mcneil  Your last dose was: Your next dose is: Take 1-2 Tabs by mouth every six (6) hours as needed for Pain. Not to exceed 4,000mg in any 24 hour period  Indications: Pain 500-1000 mg  
    
   
   
   
  
 amLODIPine 10 mg tablet Commonly known as:  Galdino Bliss Your last dose was: Your next dose is: Take 10 mg by mouth daily (with breakfast). 10 mg  
    
   
   
   
  
 aspirin delayed-release 325 mg tablet Your last dose was: Your next dose is: Take 1 Tab by mouth two (2) times a day. 325 mg  
    
   
   
   
  
 atorvastatin 80 mg tablet Commonly known as:  LIPITOR Your last dose was: Your next dose is: Take 80 mg by mouth Daily (before breakfast). 80 mg  
    
   
   
   
  
 losartan 50 mg tablet Commonly known as:  COZAAR  
   
 Your last dose was: Your next dose is: Take 100 mg by mouth daily (after breakfast). Takes 2    50 mg tabs to = 100 mg dose. 100 mg MULTIVITAMIN 50 PLUS Tab tablet Generic drug:  multivitamins-minerals-lutein Your last dose was: Your next dose is: Take 1 Tab by mouth daily. Mature Adult  From [employer]  
 1 Tab  
    
   
   
   
  
 ondansetron 8 mg disintegrating tablet Commonly known as:  ZOFRAN ODT Your last dose was: Your next dose is: Take 0.5 Tabs by mouth every eight (8) hours as needed for Nausea. 4 mg  
    
   
   
   
  
 oxyCODONE IR 5 mg immediate release tablet Commonly known as:  Alex Fuelamber Your last dose was: Your next dose is: Take 1-2 Tabs by mouth every four (4) hours as needed for Pain. Max Daily Amount: 60 mg. Indications: Pain 5-10 mg  
    
   
   
   
  
 sulindac 200 mg tablet Commonly known as:  CLINORIL Your last dose was: Your next dose is: Take 200 mg by mouth two (2) times a day. 200 mg SYNTHROID 125 mcg tablet Generic drug:  levothyroxine Your last dose was: Your next dose is: Take 125 mcg by mouth Daily (before breakfast). 125 mcg  
    
   
   
   
  
 traMADol 50 mg tablet Commonly known as:  ULTRAM  
   
Your last dose was: Your next dose is: Take 1 Tab by mouth every six (6) hours as needed for Pain (Take for breakthrough pain if Oxycodone is not working). Max Daily Amount: 200 mg. Indications: Pain, Post-op Pain, Diagnosis Hip and Knee Arthritis ICD 10 - M16.9  
 50 mg  
    
   
   
   
  
 VITAMIN C 1,000 mg tablet Generic drug:  ascorbic acid (vitamin C) Your last dose was: Your next dose is: Take 1,000 mg by mouth daily. 1000 mg Where to Get Your Medications Information on where to get these meds will be given to you by the nurse or doctor. ! Ask your nurse or doctor about these medications  
  acetaminophen 500 mg tablet  
 aspirin delayed-release 325 mg tablet  
 ondansetron 8 mg disintegrating tablet  
 oxyCODONE IR 5 mg immediate release tablet  
 traMADol 50 mg tablet Discharge Instructions Patient: Henna Jackson MRN: [id number]  SSN: [id number] PARTIAL KNEE REPLACEMENT DISCHARGE INSTRUCTIONS IMPORTANT NUMBERS  
 
OFFICE  
 [phone] or [phone] CELL  
(Dr Paola Gay Emergency Contact) - [phone] SPECIAL INSTRUCTIONS :  
1. Full extension at the knee is the most important aspect of your range of motion. Avoid placing a pillow or bump behind the knee. Rather, place the heel up on a bump or pillow and allow gravity to help straighten the knee. 2. You may weight bear as tolerated on the knee and during the day you should bend the knee as much as possible. 3. Drainage from the incision more than 4 days from surgery is concerning. Contact my office if there is any question [phone] ext [phone] / [phone]. There may be some drainage from the pin sites over the first day. Wound Care/ Dressing Changes: DRESSING :  
 
Aquacel Dressing : This may be removed by home health 7 days after the date of your surgery. If there is no drainage, then a simple dressing may be used or no dressing at all. Other dressing options can be purchased over the counter at a local pharmacy or medical supply vendor. A porous adhesive dressing such as pictured above can be purchased at a local [employer] or [employer]. You only need to keep the incision covered for 7 days after showers. A dressing may be used for longer if there are issues with clothing clinging to the incision. Showering/ Bathing: You may shower with the aquacel dressing in place.  This is left in place for 7 days following discharge from the hospital. If your incision is dry without drainage you may shower following your discharge home. After 7 days your aquacel dressing should be removed for showering. It is fine to have water run over the incision. Do not vigorously scrub your incision. Apply a clean, dry dressing after you have dried your incision. Do not take a bath or get into a swimming pool / [place] until you follow up with Dr. Luz Marina Oswald. Do not soak your incision under water. If there is continued drainage or you are concerned contact Dr Sue Castro office prior to showering [phone] ext [phone]. Diet: 
You may advance to your regular diet as tolerated. Increase your clear liquid intake for the next 2-3 days. Nutrition Recommendations for Discharge: 
 
Continue Oral Nutrition Supplements at discharge: Ensure Enlive or Ensure Plus 1-2 times per day 
for 30 days unless otherwise directed by your Primary Care Physician. This product can be purchased at your local grocery store or drug store and online. Rodrick Benoit, RD 
 _ Medication: 
 
 
1. You will be given prescriptions for pain medication when you are discharged from the hospital.  Please use the medications as prescribed. Pain medications may cause constipation- Colace or Milk of Magnesia may be used as needed. Other possible side effects of pain medication are dizziness, headache, nausea, vomiting, and urinary retention. Discontinue the pain medication if you develop itching, rash, shortness of breath, or difficulties swallowing. If these symptoms become severe or arent relieved by discontinuing the medication, you should seek immediate medical attention. 2. Refills of pain medication are authorized during office hours only (8 AM- 5 PM  Monday thru Friday) 3. If you were prescribed Percocet/Oxycodone, Hydrocodone / Velton Junk / [place] or Dilaudid / Hydromorphone you must have a written prescription.   These medications cannot be legally called into the pharmacy. 4. Do not take Tylenol/Acetaminophen in addition to your pain medication as most pain medications already contain acetaminophen. Do not exceed 4000mg of Tylenol/Acetaminophen per day. 5. You may resume the medication you were taking prior to your surgery. Pain medication may change the effects of any antidepressant medication you may be taking. If you have any questions about possible interactions between your regular medications and the discharge medications, you should consult the physician who prescribes your regular medications. 6. You will be discharged with prescriptions for additional pain medications (Tramadol or Toradol) and a medication for nausea and vomiting (Zofran or Phenergan). You only need to fill these prescriptions if the primary pain medication is not working or you experiencing post-op nausea. 7. Continue the Aspirin (or other agent) for DVT prophylaxis for a total of 30 days from the day of surgery. 8. If you have constipation not improved by oral stool softeners then a Ducolax suppository should be purchased over the counter. This will be more effective than any oral alternative. Follow up appointment: 
 
Esthela Jackson should be seen 5-7 days following your surgery to ensure you are improving as scheduled. If you do not already have an appointment please call our office at [phone] for your follow up appointment. Physical Therapy / Nursing: 
 
Physical Therapy following surgery will be arranged at home the day following your discharge. They have specific instructions for rehab and wound care. You can begin outpatient therapy as soon as you feel comfortable leaving the home, usually at 2 weeks following surgery. Call my office for questions or concerns.    
 
Important Signs and Symptoms: 
 
If any of the following signs or symptoms occurs, you should contact  [place] office. Please be advised if a problem arises which you feel requires immediate medical attention or you are unable to contact Dr. Michael Bailey office you should seek immediate medical attention at the ER or other health care facility you have access to. 
 
1. A sudden increase in swelling and/or redness or warmth at the area your surgery was performed which isnt relieved by rest, ice, and elevation. 2. Oral temperature greater than 101 degrees for 12 hours or more which isnt relieved by an increase in fluid intake and taking 2 Tylenol every 4-6 hours. 3. Excessive drainage from your incisions, or drainage which hasnt stopped by 72 hours after your surgery. 4. Fever, chills, shortness of breath, chest pain, nausea, vomiting or other signs and symptoms which are of concern to you. Call Dr Bj Pollard at [phone] (cell) after hours if there are any issues. During business hours contact the office at the above listed number. frequently asked questions ? What should I take for pain? 
o In general you will be discharged with three medications for pain (Extra Strength Tylenol, Oxycodone 5mg and Tramadol). This may vary slightly depending on what you were taking in the hospital.  
? 1st Line  Extra Strength Tylenol 1-2 tablets (500-1000mg) every 4-6 hrs. 
? After 2 days this dose should not exceed 8 tablets per day ? This is the first and only medication you need to take. Initially you may need 2 tablets every 4 hours, but as your pain subsides, this will taper to 1 tablet every 6 hours. ? 2nd Line - Tramadol 50mg (1 tablet) every 8 hours ? 3rd Line  Oxycodone 1 (5mg) - 2 (10mg) every 4 hours (Or as directed), take these between Percocet doses if your pain is not below 4 / 10. This may be needed only for several days following your discharge. ? 4th Line  Add Alleve 220mg every 12 hours or Motrin 400mg (200mg x 2) every 8 hours ? When should I call for advice regarding my pain? o After 12 hours on the above regimen, if nothing is working call the office ([phone] or [phone]) or call my cell after hours [phone]. 
? Can I get refills? 
o Narcotic refills are provided for the first 6 weeks following surgery. ? I will generally try to taper down to a single narcotic medication by your two week appointment. o Try Tylenol 650mg along with Alleve 220mg or Motrin 200mg during the majority of the day. ? Save the narcotic pain medications for physical therapy (1 hour prior) and before sleeping at night. ? Keep in mind you need to discontinue these medications prior to returning to driving. ? Is swelling normal? 
o Almost everyone has some degree of swelling following surgery. o Following hip and knee replacement surgery, swelling can be normal below the incision for the first 1-2 weeks. ? This swelling peaks around 5-7 days after surgery. ? You may have some bruising around the back of the thigh, calf and even into the foot. ? What should I do for the swelling? 
o Keep the limb elevated. o Apply compression socks (knee high for total knees and up to the mid-thigh for total hips.  
o Heat or ice may be applied, choose the modality that makes you the most comfortable. ? How long should I remain on blood thinners following surgery? 
o Thirty days ? When can I drive? 
o Once you have stopped using regular narcotic pain medications (Percocet, Lortab, etc.) and can safely apply the brakes without hesitation. ? When can I shower? o 72hours following surgery if the incision is dry. 
o No submersion of the incision, bathing or swimming for 14 days following surgery or until cleared by Dr Lynne Yusuf. ? What do I do with the dressing when I shower? 
o The ACE wrap can be removed in 3 days and the dressing taken down 
o The incision is sealed with Dermabond (Biologic glue) and except for wounds which are draining should be watertight. o A new dressing that covers the incision should be applied ? How active should I be following surgery? o Progress activities in moderation at your own pace.  
o Walk each day and set progressive goals with small increments (1st week  ½ block of walking, 2nd week  1 block, 3rd week  2 blocks, etc.) Please do not hesitate to call me at [phone] (cell phone) for questions following surgery - Timo Caldwell MD 
 
 
  
  
  
Introducing [facility]! Dear Josse Vo R: 
Thank you for requesting a [employer] account. Our records indicate that you already have an active [employer] account. You can access your account anytime at [email or web address] Did you know that you can access your hospital and ER discharge instructions at any time in [employer]? You can also review all of your test results from your hospital stay or ER visit. Additional Information If you have questions, please visit the Frequently Asked Questions section of the [employer] website at [email or web address] Remember, [employer] is NOT to be used for urgent needs. For medical emergencies, dial 911. Now available from your iPhone and Android! Providers Seen During Your Hospitalization Provider Specialty Primary office phone Timo Caldwell MD Orthopedic Surgery [phone] Your Primary Care Physician (PCP) Primary Care Physician Office Phone Office Fax UNKNOWN, PROVIDER ** None ** ** None ** You are allergic to the following No active allergies Recent Documentation Height Weight BMI Smoking Status 1.778 m 96.2 kg 30.43 kg/m2 Former Smoker Emergency Contacts Name Discharge Info Relation Home Work Mobile [address] CAREGIVER [3] Child [2] [phone] Patient Belongings The following personal items are in your possession at time of discharge: Dental Appliances: None  Visual Aid: Glasses, With patient      Home Medications: None   Jewelry: None  Clothing: Shirt, Sweater, Undergarments, Pants, Footwear    Other Valuables: None  Personal Items Sent to Safe: none Please provide this summary of care documentation to your next provider. Signatures-by signing, you are acknowledging that this After Visit Summary has been reviewed with you and you have received a copy. Patient Signature:  ____________________________________________________________ Date:  ____________________________________________________________  
  
[facility] Provider Signature:  ____________________________________________________________ Date:  ____________________________________________________________

## 2017-12-13 NOTE — ANESTHESIA PREPROCEDURE EVALUATION
Anesthetic History   No history of anesthetic complications            Review of Systems / Medical History  Patient summary reviewed, nursing notes reviewed and pertinent labs reviewed    Pulmonary  Within defined limits                 Neuro/Psych   Within defined limits           Cardiovascular    Hypertension          Hyperlipidemia    Exercise tolerance: >4 METS  Comments: Not on beta blocker   GI/Hepatic/Renal  Within defined limits              Endo/Other      Hypothyroidism  Arthritis     Other Findings   Comments: eye surgery x2 -once in childhood, again in 2005-strabismus         Physical Exam    Airway  Mallampati: II  TM Distance: < 4 cm  Neck ROM: normal range of motion   Mouth opening: Normal    Comments: beard Cardiovascular  Regular rate and rhythm,  S1 and S2 normal,  no murmur, click, rub, or gallop  Rhythm: regular  Rate: normal         Dental    Dentition: Bridges     Pulmonary  Breath sounds clear to auscultation               Abdominal  GI exam deferred       Other Findings            Anesthetic Plan    ASA: 2  Anesthesia type: regional and spinal - femoral single shot and popliteal fossa block            Anesthetic plan and risks discussed with: Patient

## 2017-12-13 NOTE — IP AVS SNAPSHOT
Summary of Care Report The Summary of Care report has been created to help improve care coordination. Users with access to [employer] or [address] (Web-based application) may access additional patient information including the Discharge Summary. If you are not currently a [address] user and need more information, please call the number listed below in the [place] [address] section and ask to be connected with Medical Records. Facility Information Name Address Phone [address]  [address] [facility] [address] [phone] Patient Information Patient Name Sex SUZI Jarrell. ([id number]) Male 1950 Discharge Information Admitting Provider Service Area Unit Isacc Rodgers MD / [phone] Carm[place]  [phone] Discharge Provider Discharge Date/Time Discharge Disposition Destination (none) 2017 (Pending) AHR (none) Patient Language Language ENGLISH [13] Hospital Problems as of 2017  Reviewed: 2017  4:47 PM by Danielle Melo PA-C Class Noted - Resolved Last Modified POA Active Problems * (Principal)Primary osteoarthritis of left knee  2017 - Present 2017 by Danielle Melo PA-C Yes Entered by Danielle Melo PA-C Overview Signed 2017  4:47 PM by Danielle Melo PA-C  
   LEFT UNICOMPARTMENTAL KNEE REPLACEMENT (Princella Gallegos) 17 S/P knee replacement  2017 - Present 2017 by Isacc Rodgers MD Unknown Entered by Isacc Rodgers MD  
  
Non-Hospital Problems as of 2017  Reviewed: 2017  4:47 PM by Danielle Melo PA-C Class Noted - Resolved Last Modified Active Problems Arthritis of knee  10/22/2014 - Present 10/24/2014   Entered by Alda Ibrahim MD  
 Thyroid disease  Unknown - Present 12/12/2017 by Darlyn Rothman PA-C Entered by Darlyn Rothman PA-C Overview Signed 12/12/2017  4:47 PM by Darlyn Rothman PA-C Pt states he doesn't know why he is taking this medication. He stated he was never told that he had a problem. Taking this medication for 15 years. Ill-defined condition  Unknown - Present 12/12/2017 by Darlyn Rothman PA-C Entered by Darlyn Rothman PA-C Overview Signed 12/12/2017  4:47 PM by Darlyn Rothman PA-C  
   hyperlipidemia Hypertension  Unknown - Present 12/12/2017 by Darlyn Rothman PA-C Entered by Darlyn Rothman PA-C Hypercholesteremia  Unknown - Present 12/12/2017 by Darlyn Rothman PA-C Entered by Darlyn Rothman PA-C Arthritis  Unknown - Present 12/12/2017 by Darlyn Rothman PA-C Entered by Darlyn Rothman PA-C You are allergic to the following No active allergies Current Discharge Medication List  
  
START taking these medications Dose & Instructions Dispensing Information Comments  
 acetaminophen 500 mg tablet Commonly known as:  [address]  Dose:  500-1000 mg Take 1-2 Tabs by mouth every six (6) hours as needed for Pain. Not to exceed 4,000mg in any 24 hour period  Indications: Pain Quantity:  60 Tab Refills:  0  
   
 ondansetron 8 mg disintegrating tablet Commonly known as:  ZOFRAN ODT Dose:  4 mg Take 0.5 Tabs by mouth every eight (8) hours as needed for Nausea. Quantity:  30 Tab Refills:  0  
   
 oxyCODONE IR 5 mg immediate release tablet Commonly known as:  Lisebill Warner Dose:  5-10 mg Take 1-2 Tabs by mouth every four (4) hours as needed for Pain. Max Daily Amount: 60 mg. Indications: Pain Quantity:  70 Tab Refills:  0 CONTINUE these medications which have CHANGED Dose & Instructions Dispensing Information Comments  
 aspirin delayed-release 325 mg tablet What changed:   
- medication strength 
- how much to take - when to take this Dose:  325 mg Take 1 Tab by mouth two (2) times a day. Quantity:  60 Tab Refills:  0  
   
 traMADol 50 mg tablet Commonly known as:  ULTRAM  
What changed:  reasons to take this Dose:  50 mg Take 1 Tab by mouth every six (6) hours as needed for Pain (Take for breakthrough pain if Oxycodone is not working). Max Daily Amount: 200 mg. Indications: Pain, Post-op Pain, Diagnosis Hip and Knee Arthritis ICD 10 - M16.9 Quantity:  60 Tab Refills:  0 CONTINUE these medications which have NOT CHANGED Dose & Instructions Dispensing Information Comments  
 amLODIPine 10 mg tablet Commonly known as:  Suzon Salle Dose:  10 mg Take 10 mg by mouth daily (with breakfast). Refills:  0  
   
 atorvastatin 80 mg tablet Commonly known as:  LIPITOR Dose:  80 mg Take 80 mg by mouth Daily (before breakfast). Refills:  0  
   
 losartan 50 mg tablet Commonly known as:  COZAAR Dose:  100 mg Take 100 mg by mouth daily (after breakfast). Takes 2    50 mg tabs to = 100 mg dose. Refills:  0 MULTIVITAMIN 50 PLUS Tab tablet Generic drug:  multivitamins-minerals-lutein Dose:  1 Tab Take 1 Tab by mouth daily. Mature Adult  From [employer]  
 Refills:  0  
   
 sulindac 200 mg tablet Commonly known as:  CLINORIL Dose:  200 mg Take 200 mg by mouth two (2) times a day. Refills:  0  
   
 SYNTHROID 125 mcg tablet Generic drug:  levothyroxine Dose:  125 mcg Take 125 mcg by mouth Daily (before breakfast). Refills:  0  
   
 VITAMIN C 1,000 mg tablet Generic drug:  ascorbic acid (vitamin C) Dose:  1000 mg Take 1,000 mg by mouth daily. Refills:  0 Surgery Information ID Date/Time Status Primary Surgeon All Procedures Location  [id number] 12/13/2017 Violeta Corea MD KNEE UNICONDYLAR REPLACEMENT ROBOTIC ASSISTED [facility]    
 KNEE UNICONDYLAR REPLACEMENT ROBOTIC ASSISTED:  KNEE UNICONDYLAR REPLACEMENT ROBOTIC ASSISTED Follow-up Information Follow up With Details Comments Contact Info [facility] [address] Emigdio Reece 
[phone] Provider Unknown   Patient not available to ask Discharge Instructions Patient: Evonne Ballesteros. MRN: [id number]  SSN: [id number] PARTIAL KNEE REPLACEMENT DISCHARGE INSTRUCTIONS IMPORTANT NUMBERS  
 
OFFICE  
 [phone] ext [phone] or [phone] CELL  
(Dr Anahi Moore Emergency Contact) - [phone] SPECIAL INSTRUCTIONS :  
1. Full extension at the knee is the most important aspect of your range of motion. Avoid placing a pillow or bump behind the knee. Rather, place the heel up on a bump or pillow and allow gravity to help straighten the knee. 2. You may weight bear as tolerated on the knee and during the day you should bend the knee as much as possible. 3. Drainage from the incision more than 4 days from surgery is concerning. Contact my office if there is any question [phone] ext [phone] / [phone]. There may be some drainage from the pin sites over the first day. Wound Care/ Dressing Changes: DRESSING :  
 
Aquacel Dressing : This may be removed by home health 7 days after the date of your surgery. If there is no drainage, then a simple dressing may be used or no dressing at all. Other dressing options can be purchased over the counter at a local pharmacy or medical supply vendor. A porous adhesive dressing such as pictured above can be purchased at a local [employer] or [employer]. You only need to keep the incision covered for 7 days after showers. A dressing may be used for longer if there are issues with clothing clinging to the incision. Showering/ Bathing: You may shower with the aquacel dressing in place.  This is left in place for 7 days following discharge from the hospital. If your incision is dry without drainage you may shower following your discharge home. After 7 days your aquacel dressing should be removed for showering. It is fine to have water run over the incision. Do not vigorously scrub your incision. Apply a clean, dry dressing after you have dried your incision. Do not take a bath or get into a swimming pool / [place] until you follow up with Dr. Gissel Gomes. Do not soak your incision under water. If there is continued drainage or you are concerned contact Dr Farnsworth [place] office prior to showering [phone] ext [phone]. Diet: 
You may advance to your regular diet as tolerated. Increase your clear liquid intake for the next 2-3 days. Nutrition Recommendations for Discharge: 
 
Continue Oral Nutrition Supplements at discharge: Ensure Enlive or Ensure Plus 1-2 times per day 
for 30 days unless otherwise directed by your Primary Care Physician. This product can be purchased at your local grocery store or drug store and online. Bakari Schreiber, JEAN CARLOS 
 _ Medication: 
 
 
1. You will be given prescriptions for pain medication when you are discharged from the hospital.  Please use the medications as prescribed. Pain medications may cause constipation- Colace or Milk of Magnesia may be used as needed. Other possible side effects of pain medication are dizziness, headache, nausea, vomiting, and urinary retention. Discontinue the pain medication if you develop itching, rash, shortness of breath, or difficulties swallowing. If these symptoms become severe or arent relieved by discontinuing the medication, you should seek immediate medical attention. 2. Refills of pain medication are authorized during office hours only (8 AM- 5 PM  Monday thru Friday) 3. If you were prescribed Percocet/Oxycodone, Hydrocodone / Sharlynn Gall / Lonne Alabaster or Dilaudid / Hydromorphone you must have a written prescription.   These medications cannot be legally called into the pharmacy. 4. Do not take Tylenol/Acetaminophen in addition to your pain medication as most pain medications already contain acetaminophen. Do not exceed 4000mg of Tylenol/Acetaminophen per day. 5. You may resume the medication you were taking prior to your surgery. Pain medication may change the effects of any antidepressant medication you may be taking. If you have any questions about possible interactions between your regular medications and the discharge medications, you should consult the physician who prescribes your regular medications. 6. You will be discharged with prescriptions for additional pain medications (Tramadol or Toradol) and a medication for nausea and vomiting (Zofran or Phenergan). You only need to fill these prescriptions if the primary pain medication is not working or you experiencing post-op nausea. 7. Continue the Aspirin (or other agent) for DVT prophylaxis for a total of 30 days from the day of surgery. 8. If you have constipation not improved by oral stool softeners then a Ducolax suppository should be purchased over the counter. This will be more effective than any oral alternative. Follow up appointment: 
 
Naga Galeas should be seen 5-7 days following your surgery to ensure you are improving as scheduled. If you do not already have an appointment please call our office at [phone] for your follow up appointment. Physical Therapy / Nursing: 
 
Physical Therapy following surgery will be arranged at home the day following your discharge. They have specific instructions for rehab and wound care. You can begin outpatient therapy as soon as you feel comfortable leaving the home, usually at 2 weeks following surgery. Call my office for questions or concerns.    
 
Important Signs and Symptoms: 
 
If any of the following signs or symptoms occurs, you should contact  [place] office. Please be advised if a problem arises which you feel requires immediate medical attention or you are unable to contact Dr. Dhiraj Pelayo office you should seek immediate medical attention at the ER or other health care facility you have access to. 
 
1. A sudden increase in swelling and/or redness or warmth at the area your surgery was performed which isnt relieved by rest, ice, and elevation. 2. Oral temperature greater than 101 degrees for 12 hours or more which isnt relieved by an increase in fluid intake and taking 2 Tylenol every 4-6 hours. 3. Excessive drainage from your incisions, or drainage which hasnt stopped by 72 hours after your surgery. 4. Fever, chills, shortness of breath, chest pain, nausea, vomiting or other signs and symptoms which are of concern to you. Call Dr Queenie Hendrix at [phone] (fctq) after hours if there are any issues. During business hours contact the office at the above listed number. frequently asked questions ? What should I take for pain? 
o In general you will be discharged with three medications for pain (Extra Strength Tylenol, Oxycodone 5mg and Tramadol). This may vary slightly depending on what you were taking in the hospital.  
? 1st Line  Extra Strength Tylenol 1-2 tablets (500-1000mg) every 4-6 hrs. 
? After 2 days this dose should not exceed 8 tablets per day ? This is the first and only medication you need to take. Initially you may need 2 tablets every 4 hours, but as your pain subsides, this will taper to 1 tablet every 6 hours. ? 2nd Line - Tramadol 50mg (1 tablet) every 8 hours ? 3rd Line  Oxycodone 1 (5mg) - 2 (10mg) every 4 hours (Or as directed), take these between Percocet doses if your pain is not below 4 / 10. This may be needed only for several days following your discharge. ? 4th Line  Add Alleve 220mg every 12 hours or Motrin 400mg (200mg x 2) every 8 hours ? When should I call for advice regarding my pain? o After 12 hours on the above regimen, if nothing is working call the office [phone] or [phone]) or call my cell after hours [phone]. 
? Can I get refills? 
o Narcotic refills are provided for the first 6 weeks following surgery. ? I will generally try to taper down to a single narcotic medication by your two week appointment. o Try Tylenol 650mg along with Alleve 220mg or Motrin 200mg during the majority of the day. ? Save the narcotic pain medications for physical therapy (1 hour prior) and before sleeping at night. ? Keep in mind you need to discontinue these medications prior to returning to driving. ? Is swelling normal? 
o Almost everyone has some degree of swelling following surgery. o Following hip and knee replacement surgery, swelling can be normal below the incision for the first 1-2 weeks. ? This swelling peaks around 5-7 days after surgery. ? You may have some bruising around the back of the thigh, calf and even into the foot. ? What should I do for the swelling? 
o Keep the limb elevated. o Apply compression socks (knee high for total knees and up to the mid-thigh for total hips.  
o Heat or ice may be applied, choose the modality that makes you the most comfortable. ? How long should I remain on blood thinners following surgery? 
o Thirty days ? When can I drive? 
o Once you have stopped using regular narcotic pain medications (Percocet, Lortab, etc.) and can safely apply the brakes without hesitation. ? When can I shower? o 72hours following surgery if the incision is dry. 
o No submersion of the incision, bathing or swimming for 14 days following surgery or until cleared by Dr Yu Nails. ? What do I do with the dressing when I shower? 
o The ACE wrap can be removed in 3 days and the dressing taken down 
o The incision is sealed with Dermabond (Biologic glue) and except for wounds which are draining should be watertight. o A new dressing that covers the incision should be applied ? How active should I be following surgery? o Progress activities in moderation at your own pace.  
o Walk each day and set progressive goals with small increments (1st week  ½ block of walking, 2nd week  1 block, 3rd week  2 blocks, etc.) Please do not hesitate to call me at [phone] (cell phone) for questions following surgery - Delia Chavarria MD 
 
 
Chart Review Routing History Recipient Method Report Sent By Claudia Chavarria MD  
Phone: [phone] In [facility] CUSTOM LAB REPORT Yolanda Mendieta NP [[id number]] 12/7/2017 11:22 AM 12/6/2017

## 2017-12-13 NOTE — H&P (VIEW-ONLY)
PAT Pre-Op History & Physical    Patient: Gordo Alcantara. MRN: [id number]          SSN: [id number]  YOB: 1950          Age: 79 y.o. Sex: male                Subjective:   Patient is a 79 y.o.  male who presents with history of chronic left knee pain that has been a problem for about 1 year per patient report. Rates his left knee pain 5/10 and describes it as an intermittent sharp, stabbing pain along medial aspect of his left knee. Walking any significant distance exacerbates his left knee pain. Sitting down relieves his pain. States that he likes to go to festival and other events and his left knee pain is keeping him from enjoying these activities. Had right TKA 10/2014 with good results. Has failed steroid joint injections, NSAIDs, Tramadol, PT, and application of ice. The patient was evaluated in the surgeon's office and it was determined that the most appropriate plan of care is to proceed with surgical intervention. Patient's PCP Sunita Choudhury MD            Past Medical History:   Diagnosis Date    Arthritis     Hypercholesteremia     Hypertension     Ill-defined condition     hyperlipidemia    Ill-defined condition 12/06/2017    obesity  BMI= 30.8    Thyroid disease     Pt states he doesn't know why he is taking this medication. He stated he was never told that he had a problem. Taking this medication for 15 years. Past Surgical History:   Procedure Laterality Date    HX HEENT      eye surgery x2 -once in childhood, again in 2005    HX KNEE REPLACEMENT Right 10/2014    HX TONSILLECTOMY      child      Prior to Admission medications    Medication Sig Start Date End Date Taking? Authorizing Provider   aspirin delayed-release 81 mg tablet Take 81 mg by mouth daily. Yes Historical Provider   amLODIPine (NORVASC) 10 mg tablet Take 10 mg by mouth daily (with breakfast).    Yes Historical Provider   sulindac (CLINORIL) 200 mg tablet Take 200 mg by mouth two (2) times a day. Yes Historical Provider   tramadol (ULTRAM) 50 mg tablet Take 1 tablet by mouth every six (6) hours as needed for Pain. Patient taking differently: Take 50 mg by mouth every six (6) hours as needed for Pain. Indications: Pain 10/23/14  Yes Taya Calixto MD   losartan (COZAAR) 50 mg tablet Take 100 mg by mouth daily (after breakfast). Takes 2    50 mg tabs to = 100 mg dose. Yes Historical Provider   atorvastatin (LIPITOR) 80 mg tablet Take 80 mg by mouth Daily (before breakfast). Yes Historical Provider   levothyroxine (SYNTHROID) 125 mcg tablet Take 125 mcg by mouth Daily (before breakfast). Yes Historical Provider   ascorbic acid (VITAMIN C) 1,000 mg tablet Take 1,000 mg by mouth daily. Yes Historical Provider   multivitamins-minerals-lutein (SENIOR VITAMIN) tab tablet Take 1 Tab by mouth daily. Mature Adult  From [facility]    Historical Provider     Current Outpatient Prescriptions   Medication Sig    aspirin delayed-release 81 mg tablet Take 81 mg by mouth daily.  amLODIPine (NORVASC) 10 mg tablet Take 10 mg by mouth daily (with breakfast).  sulindac (CLINORIL) 200 mg tablet Take 200 mg by mouth two (2) times a day.  tramadol (ULTRAM) 50 mg tablet Take 1 tablet by mouth every six (6) hours as needed for Pain. (Patient taking differently: Take 50 mg by mouth every six (6) hours as needed for Pain. Indications: Pain)    losartan (COZAAR) 50 mg tablet Take 100 mg by mouth daily (after breakfast). Takes 2    50 mg tabs to = 100 mg dose.  atorvastatin (LIPITOR) 80 mg tablet Take 80 mg by mouth Daily (before breakfast).  levothyroxine (SYNTHROID) 125 mcg tablet Take 125 mcg by mouth Daily (before breakfast).  ascorbic acid (VITAMIN C) 1,000 mg tablet Take 1,000 mg by mouth daily.  multivitamins-minerals-lutein (SENIOR VITAMIN) tab tablet Take 1 Tab by mouth daily.  Mature Adult  From [facility]     No current facility-administered medications for this encounter. No Known Allergies   Social History   Substance Use Topics    Smoking status: Former Smoker     Packs/day: 2.00     Years: 12.00     Quit date: 1997    Smokeless tobacco: Never Used    Alcohol use 6.0 oz/week     6 Glasses of wine, 6 Cans of beer per week      Comment: weekly        History   Drug Use No     Family History   Problem Relation Age of Onset    Stroke Mother     Hypertension Mother     Alcohol abuse Mother     Alcohol abuse Father          Review of Systems    Patient denies difficulty swallowing, mouth sores, or loose teeth. Patient denies any recent dental procedures or any planned prior to surgery. Patient denies chest pain, tightness, pain radiating down left arm, palpitations. Denies dizziness, visual disturbances, or lightheadedness. Patient denies shortness of breath, wheezing, cough, fever, or chills. Patient denies diarrhea, constipation, or abdominal pain. Patient denies urinary problems including dysuria, hesitancy, urgency, or incontinence. Denies skin breakdown, rashes, insect bites or open area. C/o left knee pain. Objective:   Patient Vitals for the past 24 hrs:   Temp Pulse Resp BP SpO2   17 1520 97.4 °F (36.3 °C) 64 17 146/79 96 %     Temp (24hrs), Av.4 °F (36.3 °C), Min:97.4 °F (36.3 °C), Max:97.4 °F (36.3 °C)    Body mass index is 30.81 kg/(m^2). Wt Readings from Last 1 Encounters:   17 97.4 kg (214 lb 11.7 oz)        Physical Exam:     General: Pleasant,  cooperative, no apparent distress, appears stated age. Eyes: Conjunctivae/corneas clear. EOMs intact. Nose: Nares normal.   Mouth/Throat: Lips, mucosa, and tongue normal. Teeth and gums normal.   Neck: Supple, symmetrical, trachea midline. Back: Symmetric   Lungs: Clear to auscultation bilaterally. Heart: Regular rate and rhythm, S1, S2 normal. No murmur, click, rub or gallop. Abdomen: Soft, non-tender. Bowel sounds normal. No distention.    Musculoskeletal: Gait is antalgic. Extremities:  Extremities normal, atraumatic, no cyanosis or edema. Calves                                 supple, non tender to palpation. Pulses: 2+ and symmetric bilateral upper extremities. Cap. refill <2 seconds   Skin: Skin color, texture, turgor normal.  No rashes or lesions. Neurologic: CN II-XII grossly intact. Alert and oriented x3. Labs:   Recent Results (from the past 72 hour(s))   C REACTIVE PROTEIN, QT    Collection Time: 12/06/17  4:05 PM   Result Value Ref Range    C-Reactive protein 0.60 (H) <0.60 mg/dL   CBC WITH AUTOMATED DIFF    Collection Time: 12/06/17  4:05 PM   Result Value Ref Range    WBC 7.2 4.1 - 11.1 K/uL    RBC 4.60 4.10 - 5.70 M/uL    HGB 14.8 12.1 - 17.0 g/dL    HCT 42.4 36.6 - 50.3 %    MCV 92.2 80.0 - 99.0 FL    MCH 32.2 26.0 - 34.0 PG    MCHC 34.9 30.0 - 36.5 g/dL    RDW 12.9 11.5 - 14.5 %    PLATELET 136 477 - 150 K/uL    NEUTROPHILS 65 32 - 75 %    LYMPHOCYTES 22 12 - 49 %    MONOCYTES 9 5 - 13 %    EOSINOPHILS 3 0 - 7 %    BASOPHILS 1 0 - 1 %    ABS. NEUTROPHILS 4.6 1.8 - 8.0 K/UL    ABS. LYMPHOCYTES 1.6 0.8 - 3.5 K/UL    ABS. MONOCYTES 0.7 0.0 - 1.0 K/UL    ABS. EOSINOPHILS 0.2 0.0 - 0.4 K/UL    ABS. BASOPHILS 0.1 0.0 - 0.1 K/UL   METABOLIC PANEL, COMPREHENSIVE    Collection Time: 12/06/17  4:05 PM   Result Value Ref Range    Sodium 145 136 - 145 mmol/L    Potassium 4.4 3.5 - 5.1 mmol/L    Chloride 106 97 - 108 mmol/L    CO2 27 21 - 32 mmol/L    Anion gap 12 5 - 15 mmol/L    Glucose 95 65 - 100 mg/dL    BUN 18 6 - 20 MG/DL    Creatinine 1.19 0.70 - 1.30 MG/DL    BUN/Creatinine ratio 15 12 - 20      GFR est AA >60 >60 ml/min/1.73m2    GFR est non-AA >60 >60 ml/min/1.73m2    Calcium 10.3 (H) 8.5 - 10.1 MG/DL    Bilirubin, total 0.5 0.2 - 1.0 MG/DL    ALT (SGPT) 31 12 - 78 U/L    AST (SGOT) 20 15 - 37 U/L    Alk.  phosphatase 81 45 - 117 U/L    Protein, total 7.2 6.4 - 8.2 g/dL    Albumin 4.2 3.5 - 5.0 g/dL    Globulin 3.0 2.0 - 4.0 g/dL    A-G Ratio 1.4 1.1 - 2.2     HEMOGLOBIN A1C WITH EAG    Collection Time: 12/06/17  4:05 PM   Result Value Ref Range    Hemoglobin A1c 6.1 4.2 - 6.3 %    Est. average glucose 128 mg/dL   SED RATE (ESR)    Collection Time: 12/06/17  4:05 PM   Result Value Ref Range    Sed rate, automated 15 0 - 20 mm/hr   URINALYSIS W/ REFLEX CULTURE    Collection Time: 12/06/17  4:05 PM   Result Value Ref Range    Color YELLOW/STRAW      Appearance CLEAR CLEAR      Specific gravity 1.018 1.003 - 1.030      pH (UA) 5.0 5.0 - 8.0      Protein NEGATIVE  NEG mg/dL    Glucose NEGATIVE  NEG mg/dL    Ketone NEGATIVE  NEG mg/dL    Bilirubin NEGATIVE  NEG      Blood NEGATIVE  NEG      Urobilinogen 0.2 0.2 - 1.0 EU/dL    Nitrites NEGATIVE  NEG      Leukocyte Esterase NEGATIVE  NEG      WBC 0-4 0 - 4 /hpf    RBC 0-5 0 - 5 /hpf    Epithelial cells FEW FEW /lpf    Bacteria NEGATIVE  NEG /hpf    UA:UC IF INDICATED CULTURE NOT INDICATED BY UA RESULT CNI      Hyaline cast 2-5 0 - 5 /lpf       Assessment:     Left knee pain. Plan:     Scheduled for knee unicondylar replacement robotic assisted. Labs and EKG done per surgeon's orders. Labs reviewed- unremarkable except CRP= 0.60. MRSA and transferrin pending. Patient attended joint class prior to 10/2014 right TKA.         Franko Fatima NP

## 2017-12-13 NOTE — ANESTHESIA PROCEDURE NOTES
Peripheral Block    Start time: 12/13/2017 12:17 PM  End time: 12/13/2017 12:24 PM  Performed by: Monse Hall  Authorized by: Lior Roberto       Pre-procedure: Indications: at surgeon's request and post-op pain management    Preanesthetic Checklist: patient identified, risks and benefits discussed, site marked, timeout performed, anesthesia consent given and patient being monitored    Timeout Time: 12:17          Block Type:   Block Type:  Popliteal and femoral single shot  Laterality:  Left  Monitoring:  Continuous pulse ox, frequent vital sign checks, heart rate and responsive to questions  Injection Technique:  Single shot  Procedures: ultrasound guided and nerve stimulator    Patient Position: supine  Prep: chlorhexidine    Location:  Upper thigh  Needle Type:  Stimuplex  Needle Gauge:  22 G  Needle Localization:  Nerve stimulator and ultrasound guidance  Medication Injected:  0.2%  ropivacaine  Volume (mL):  30    Assessment:  Number of attempts:  1  Injection Assessment:  Incremental injection every 5 mL, local visualized surrounding nerve on ultrasound, negative aspiration for blood, no paresthesia and no intravascular symptoms  Patient tolerance:  Patient tolerated the procedure well with no immediate complications  Popliteal block done under ultrasound guidance and nerve stimulation with incremental injection of Ropivacaine 0.2% 20 cc using a 21 G Stimuplex needle.

## 2017-12-13 NOTE — ROUTINE PROCESS
TRANSFER - OUT REPORT:    Verbal report given to Cassy Ruff RN on [facility] .  being transferred to [facility] [phone] for routine post - op       Report consisted of patients Situation, Background, Assessment and   Recommendations(SBAR). Information from the following report(s) SBAR was reviewed with the receiving nurse. Lines:   Peripheral IV 12/13/17 Left Forearm (Active)   Site Assessment Clean, dry, & intact 12/13/2017  4:11 PM   Phlebitis Assessment 0 12/13/2017  4:11 PM   Infiltration Assessment 0 12/13/2017  4:11 PM   Dressing Status Clean, dry, & intact 12/13/2017  4:11 PM   Dressing Type Transparent 12/13/2017  4:11 PM   Hub Color/Line Status Green; Infusing 12/13/2017  4:11 PM   Alcohol Cap Used Yes 12/13/2017 11:09 AM        Opportunity for questions and clarification was provided.       Patient transported with:   Registered Nurse

## 2017-12-13 NOTE — OP NOTES
OPERATIVE REPORT     Admit Date: 12/13/2017  Admit Diagnosis: LEFT KNEE PAIN  Primary osteoarthritis of left knee    Date of Procedure: 12/13/2017   Preoperative Diagnosis: LEFT KNEE PAIN  Postoperative Diagnosis: * No post-op diagnosis entered *    Procedure: Procedure(s):  KNEE UNICONDYLAR REPLACEMENT ROBOTIC ASSISTED  Surgeon: Taya Calixto MD  Assistant(s): Tricia Millan PA-C  Anesthesia: Regional   Estimated Blood Loss: 100cc  Specimens: * No specimens in log *   Complications: None           INDICATIONS:   The patient is a 79 y.o., male who has complained of a long history of knee pain. The patient  has failed conservative treatment and presents for definitive operative care. Informed consent obtained including a discussion of the risks and benefits, which include, but are not limited to, bleeding, infection, neurovascular damage, wound complications, pain and stiffness in the knee, periprosthetic loosening, fracture dislocation and DVT, the patient consented for the procedure. DESCRIPTION OF PROCEDURE:   The patient was seen in the preoperative holding area. The patient was positively identified. The limb was initialed,  questions were answered. The patient was subsequently taken to the operating room. The patient underwent   general endotracheal anesthesia. The patient was positioned in the supine   position. All bony prominences were well padded. The limb was prepped and   draped in a sterile fashion. The appropriate pause for safety was performed. Steinmen pins were placed for the femoral array (one hand breadth proximal to the superior pole of the patella) and tibial array (one hand breadth distal to the tibial tubercle). The skin at these areas was injected with saline and epinepherine to decrease bleeding. The hip center or rotation was established. Utilizing an incision from the superior pole of the patella distally to the tibial tubercle.  The incision was taken down through the skin and subcutaneous tissue until the retinaculum could be identified. This was sharply incised utilizing a medial parapatellar incision. This was taken   down, and care was taken not to damage the trochlea. The medial-based soft   tissue was retracted as well as the infrapatellar fat pad. The ACL was   noted to be intact. The femoral and tibial trackers were then placed. Registration was performed on the femur and tibia. The knee was taken through a complete range of motion with applied varus force with 6 data points. Implant position and alignment was verified. Once this was complete the MAKOplasty robot was positioned. Burring was then performed for the femur and then the tibia. Trial implants were placed and range of motion along with overall fit was established with very good integrity of the MCL noted. Bony osteophytes were removed and the meniscal rim was removed. The knee was injected with 60cc of Morphine / Ketoralac and Lidocaine. The knee was then exsanguinated and the trial implants were removed. Subsequently, all the bony surfaces were irrigated. Cement was mixed, and   the final components were implanted. The femur was placed first, then the tibia. Care   was taken to get the posterior tibial cement from behind the tray,  and care was used to remove all cement. The final poly ethylene was placed. The wound was copiously irrigated with normal saline, and then the final polyethylene was implanted. The knee was very stable after it was taken through a full range of motion. The wound was closed with 0 Vicryl and then number 2 Quill proximally. Once this had been completed, the skin was closed with 2-0 Vicryl   4-0 monocryl suture and Dermabond. The tibial and femoral incisions were closed with 4-0 Monocryl. A sterile dressing was applied. The patient was taken from the operating room in stable condition. OPERATIVE FINDINGS : Severe Varus OA    IMPLANTS :     Implant Name Type Inv.  Item Serial No.  Lot No. LRB No. Used Action   CEMENT BNE SIMPLEX W/O GENT -- PK/10 ONLY - SNA  CEMENT BNE SIMPLEX W/O GENT -- PK/10 ONLY NA [employer] HOW [id number] Left 1 Implanted   IMPLANT RECORD             1 Implanted       JUSTIFICATION FOR SURGICAL ASSISTANT:   Surgical Assistant, was requried and necessary in this case, to help with soft tissue retraction, extremity positioning, equiment management, implant management, and wound closure.     Kali Auguste MD

## 2017-12-13 NOTE — IP AVS SNAPSHOT
[address] 
[phone] Patient: Debra Nina. MRN: [id number] EGF:[id number] My Medications TAKE these medications as instructed Instructions Each Dose to Equal  
 Morning Noon Evening Bedtime  
 acetaminophen 500 mg tablet Commonly known as:  [facility] Your last dose was: Your next dose is: Take 1-2 Tabs by mouth every six (6) hours as needed for Pain. Not to exceed 4,000mg in any 24 hour period  Indications: Pain 500-1000 mg  
    
   
   
   
  
 amLODIPine 10 mg tablet Commonly known as:  Christian Harder Your last dose was: Your next dose is: Take 10 mg by mouth daily (with breakfast). 10 mg  
    
   
   
   
  
 aspirin delayed-release 325 mg tablet Your last dose was: Your next dose is: Take 1 Tab by mouth two (2) times a day. 325 mg  
    
   
   
   
  
 atorvastatin 80 mg tablet Commonly known as:  LIPITOR Your last dose was: Your next dose is: Take 80 mg by mouth Daily (before breakfast). 80 mg  
    
   
   
   
  
 losartan 50 mg tablet Commonly known as:  COZAAR Your last dose was: Your next dose is: Take 100 mg by mouth daily (after breakfast). Takes 2    50 mg tabs to = 100 mg dose. 100 mg MULTIVITAMIN 50 PLUS Tab tablet Generic drug:  multivitamins-minerals-lutein Your last dose was: Your next dose is: Take 1 Tab by mouth daily. Mature Adult  From [employer]  
 1 Tab  
    
   
   
   
  
 ondansetron 8 mg disintegrating tablet Commonly known as:  ZOFRAN ODT Your last dose was: Your next dose is: Take 0.5 Tabs by mouth every eight (8) hours as needed for Nausea. 4 mg  
    
   
   
   
  
 oxyCODONE IR 5 mg immediate release tablet Commonly known as:  Nancy Patrick Your last dose was: Your next dose is: Take 1-2 Tabs by mouth every four (4) hours as needed for Pain. Max Daily Amount: 60 mg. Indications: Pain 5-10 mg  
    
   
   
   
  
 sulindac 200 mg tablet Commonly known as:  CLINORIL Your last dose was: Your next dose is: Take 200 mg by mouth two (2) times a day. 200 mg SYNTHROID 125 mcg tablet Generic drug:  levothyroxine Your last dose was: Your next dose is: Take 125 mcg by mouth Daily (before breakfast). 125 mcg  
    
   
   
   
  
 traMADol 50 mg tablet Commonly known as:  ULTRAM  
   
Your last dose was: Your next dose is: Take 1 Tab by mouth every six (6) hours as needed for Pain (Take for breakthrough pain if Oxycodone is not working). Max Daily Amount: 200 mg. Indications: Pain, Post-op Pain, Diagnosis Hip and Knee Arthritis ICD 10 - M16.9  
 50 mg  
    
   
   
   
  
 VITAMIN C 1,000 mg tablet Generic drug:  ascorbic acid (vitamin C) Your last dose was: Your next dose is: Take 1,000 mg by mouth daily. 1000 mg Where to Get Your Medications Information on where to get these meds will be given to you by the nurse or doctor. ! Ask your nurse or doctor about these medications  
  acetaminophen 500 mg tablet  
 aspirin delayed-release 325 mg tablet  
 ondansetron 8 mg disintegrating tablet  
 oxyCODONE IR 5 mg immediate release tablet  
 traMADol 50 mg tablet

## 2017-12-13 NOTE — PROGRESS NOTES
12/13/17     MSW met with the patient who just arrived from the PACU. Address confirmed. He lives alone in a one story home with 3 steps and railings on both sides to enter. He was independent and working PTA. He has a walker, crutches, cane, shower chair and elevated toilet. He uses [facility] in the past and requested them again. His son will stay with him for a few days and then he has paid a caregiver to stay with him. His PCP is Dr. Christiano Phelan. He uses [facility] for his prescriptions. Referral for [facility] made through [facility]. Care Management Interventions  PCP Verified by CM:  Yes (Dr. Christiano Phelan)  Palliative Care Criteria Met (RRAT>21 & CHF Dx)?: No  Transition of Care Consult (CM Consult): [address]: No  Physical Therapy Consult: Yes  Occupational Therapy Consult: Yes  Current Support Network: Lives Alone  Confirm Follow Up Transport: Family  Plan discussed with Pt/Family/Caregiver: Yes  Discharge Location  Discharge Placement: Home with home health     BARTOLO Arora

## 2017-12-13 NOTE — ANESTHESIA POSTPROCEDURE EVALUATION
Post-Anesthesia Evaluation and Assessment    Patient: Jitendra Nath. MRN: [id number]  SSN: [id number]    YOB: 1950  Age: 79 y.o. Sex: male       Cardiovascular Function/Vital Signs  Visit Vitals    /60    Pulse 61    Temp 36.8 °C (98.3 °F)    Resp 17    Ht 5' 10\" (1.778 m)    Wt 96.2 kg (212 lb 1.3 oz)    SpO2 100%    BMI 30.43 kg/m2       Patient is status post regional, spinal anesthesia for Procedure(s):  KNEE UNICONDYLAR REPLACEMENT ROBOTIC ASSISTED. Nausea/Vomiting: None    Postoperative hydration reviewed and adequate. Pain:  Pain Scale 1: Numeric (0 - 10) (12/13/17 1435)  Pain Intensity 1: 0 (12/13/17 1435)   Managed    Neurological Status:   Neuro (WDL): Exceptions to WDL (12/13/17 1435)  Neuro  Neurologic State: Drowsy (12/13/17 1435)  LUE Motor Response: Purposeful (12/13/17 1435)  LLE Motor Response: No movement to any stimulus;Numbness (12/13/17 1435)  RUE Motor Response: Purposeful (12/13/17 1435)  RLE Motor Response: No movement to any stimulus;Numbness (12/13/17 1435)   Block resolving: now at L1    Mental Status and Level of Consciousness: Alert and oriented     Pulmonary Status:   O2 Device: Nasal cannula (12/13/17 1435)   Adequate oxygenation and airway patent    Complications related to anesthesia: None    Post-anesthesia assessment completed.  No concerns    Signed By: Baylee Carmona DO     December 13, 2017

## 2017-12-13 NOTE — PROGRESS NOTES
1610: Primary Nurse Kelly Fuller and Philip Bustos RN performed a dual skin assessment on this patient No impairment noted--Adonay score is 23    Shift change report given to Laurita Peres (oncoming nurse) by [facility] (offgoing nurse). Report included the following information SBAR, Kardex, Procedure Summary, Intake/Output, MAR and Recent Results.

## 2017-12-13 NOTE — DISCHARGE INSTRUCTIONS
Patient: Lisa Lazo. MRN: [id number]  SSN: [id number]            PARTIAL KNEE REPLACEMENT DISCHARGE INSTRUCTIONS     IMPORTANT NUMBERS     OFFICE    (Rajinder[facility]gr[facility] 227) [phone] ext [phone] or [phone]    CELL   (Dr Tianna Rayo Emergency Contact) - [phone]           SPECIAL INSTRUCTIONS :   1. Full extension at the knee is the most important aspect of your range of motion. Avoid placing a pillow or bump behind the knee. Rather, place the heel up on a bump or pillow and allow gravity to help straighten the knee. 2. You may weight bear as tolerated on the knee and during the day you should bend the knee as much as possible. 3. Drainage from the incision more than 4 days from surgery is concerning. Contact my office if there is any question [phone] [phone] / [phone]. There may be some drainage from the pin sites over the first day. Wound Care/ Dressing Changes:    DRESSING :     Aquacel Dressing : This may be removed by home health 7 days after the date of your surgery. If there is no drainage, then a simple dressing may be used or no dressing at all. Other dressing options can be purchased over the counter at a local pharmacy or medical supply vendor. A porous adhesive dressing such as pictured above can be purchased at a local [employer] or [employer]. You only need to keep the incision covered for 7 days after showers. A dressing may be used for longer if there are issues with clothing clinging to the incision. Showering/ Bathing: You may shower with the aquacel dressing in place. This is left in place for 7 days following discharge from the hospital. If your incision is dry without drainage you may shower following your discharge home. After 7 days your aquacel dressing should be removed for showering. It is fine to have water run over the incision. Do not vigorously scrub your incision. Apply a clean, dry dressing after you have dried your incision.   Do not take a bath or get into a [employer] / Claudia Martinez until you follow up with Dr. Melissa Parikh. Do not soak your incision under water. If there is continued drainage or you are concerned contact Dr Dell Landin office prior to showering [phone] ext [phone]. Diet:  You may advance to your regular diet as tolerated. Increase your clear liquid intake for the next 2-3 days. Nutrition Recommendations for Discharge:    Continue Oral Nutrition Supplements at discharge:   Ensure Enlive or Ensure Plus 1-2 times per day  for 30 days unless otherwise directed by your Primary Care Physician. This product can be purchased at your local grocery store or drug store and online. Roderick Doss, RD   _        Medication:      1. You will be given prescriptions for pain medication when you are discharged from the hospital.  Please use the medications as prescribed. Pain medications may cause constipation- Colace or Milk of Magnesia may be used as needed. Other possible side effects of pain medication are dizziness, headache, nausea, vomiting, and urinary retention. Discontinue the pain medication if you develop itching, rash, shortness of breath, or difficulties swallowing. If these symptoms become severe or arent relieved by discontinuing the medication, you should seek immediate medical attention. 2. Refills of pain medication are authorized during office hours only (8 AM- 5 PM  Monday thru Friday)  3. If you were prescribed Percocet/Oxycodone, Hydrocodone / Marigene Custard / Grace Warren or Dilaudid / Hydromorphone you must have a written prescription. These medications cannot be legally called into the pharmacy. 4. Do not take Tylenol/Acetaminophen in addition to your pain medication as most pain medications already contain acetaminophen. Do not exceed 4000mg of Tylenol/Acetaminophen per day. 5. You may resume the medication you were taking prior to your surgery. Pain medication may change the effects of any antidepressant medication you may be taking.   If you have any questions about possible interactions between your regular medications and the discharge medications, you should consult the physician who prescribes your regular medications. 6. You will be discharged with prescriptions for additional pain medications (Tramadol or Toradol) and a medication for nausea and vomiting (Zofran or Phenergan). You only need to fill these prescriptions if the primary pain medication is not working or you experiencing post-op nausea. 7. Continue the Aspirin (or other agent) for DVT prophylaxis for a total of 30 days from the day of surgery. 8. If you have constipation not improved by oral stool softeners then a Ducolax suppository should be purchased over the counter. This will be more effective than any oral alternative. Follow up appointment:    Fausto Castillo should be seen 5-7 days following your surgery to ensure you are improving as scheduled. If you do not already have an appointment please call our office at [phone] for your follow up appointment. Physical Therapy / Nursing:    Physical Therapy following surgery will be arranged at home the day following your discharge. They have specific instructions for rehab and wound care. You can begin outpatient therapy as soon as you feel comfortable leaving the home, usually at 2 weeks following surgery. Call my office for questions or concerns. Important Signs and Symptoms:    If any of the following signs or symptoms occurs, you should contact Dr. Warren Ruff office. Please be advised if a problem arises which you feel requires immediate medical attention or you are unable to contact Dr. Warren Ruff office you should seek immediate medical attention at the ER or other health care facility you have access to.    1. A sudden increase in swelling and/or redness or warmth at the area your surgery was performed which isnt relieved by rest, ice, and elevation.   2. Oral temperature greater than 101 degrees for 12 hours or more which isnt relieved by an increase in fluid intake and taking 2 Tylenol every 4-6 hours. 3. Excessive drainage from your incisions, or drainage which hasnt stopped by 72 hours after your surgery. 4. Fever, chills, shortness of breath, chest pain, nausea, vomiting or other signs and symptoms which are of concern to you. Call Dr Bard Vizcarra at [phone] (cell) after hours if there are any issues. During business hours contact the office at the above listed number. frequently asked questions   What should I take for pain?  o In general you will be discharged with three medications for pain (Extra Strength Tylenol, Oxycodone 5mg and Tramadol). This may vary slightly depending on what you were taking in the hospital.   - 1st Line - Extra Strength Tylenol 1-2 tablets (500-1000mg) every 4-6 hrs.  After 2 days this dose should not exceed 8 tablets per day   This is the first and only medication you need to take. Initially you may need 2 tablets every 4 hours, but as your pain subsides, this will taper to 1 tablet every 6 hours. - 2nd Line - Tramadol 50mg (1 tablet) every 8 hours  - 3rd Line - Oxycodone 1 (5mg) - 2 (10mg) every 4 hours (Or as directed), take these between Percocet doses if your pain is not below 4 / 10. This may be needed only for several days following your discharge. - 4th Line - Add Alleve 220mg every 12 hours or Motrin 400mg (200mg x 2) every 8 hours   When should I call for advice regarding my pain?  o After 12 hours on the above regimen, if nothing is working call the office ([phone] or [phone]) or call my cell after hours [phone].  Can I get refills?  o Narcotic refills are provided for the first 6 weeks following surgery. - I will generally try to taper down to a single narcotic medication by your two week appointment. o Try Tylenol 650mg along with Alleve 220mg or Motrin 200mg during the majority of the day.   - Save the narcotic pain medications for physical therapy (1 hour prior) and before sleeping at night. - Keep in mind you need to discontinue these medications prior to returning to driving.  Is swelling normal?  o Almost everyone has some degree of swelling following surgery. o Following hip and knee replacement surgery, swelling can be normal below the incision for the first 1-2 weeks. - This swelling peaks around 5-7 days after surgery.   - You may have some bruising around the back of the thigh, calf and even into the foot.  What should I do for the swelling?  o Keep the limb elevated. o Apply compression socks (knee high for total knees and up to the mid-thigh for total hips.   o Heat or ice may be applied, choose the modality that makes you the most comfortable.  How long should I remain on blood thinners following surgery?  o Thirty days   When can I drive?  o Once you have stopped using regular narcotic pain medications (Percocet, Lortab, etc.) and can safely apply the brakes without hesitation.  When can I shower? o 72hours following surgery if the incision is dry.  o No submersion of the incision, bathing or swimming for 14 days following surgery or until cleared by Dr Ivonne Guevara.  What do I do with the dressing when I shower?  o The ACE wrap can be removed in 3 days and the dressing taken down  o The incision is sealed with Dermabond (Biologic glue) and except for wounds which are draining should be watertight.   o A new dressing that covers the incision should be applied   How active should I be following surgery?   o Progress activities in moderation at your own pace.   o Walk each day and set progressive goals with small increments (1st week - ½ block of walking, 2nd week - 1 block, 3rd week - 2 blocks, etc.)    Please do not hesitate to call me at [phone] (cell phone) for questions following surgery - Cris Steiner MD

## 2017-12-14 VITALS
HEIGHT: 70 IN | WEIGHT: 212.08 LBS | RESPIRATION RATE: 17 BRPM | TEMPERATURE: 97.4 F | OXYGEN SATURATION: 97 % | SYSTOLIC BLOOD PRESSURE: 127 MMHG | HEART RATE: 62 BPM | BODY MASS INDEX: 30.36 KG/M2 | DIASTOLIC BLOOD PRESSURE: 77 MMHG

## 2017-12-14 LAB
ANION GAP SERPL CALC-SCNC: 9 MMOL/L (ref 5–15)
BUN SERPL-MCNC: 9 MG/DL (ref 6–20)
BUN/CREAT SERPL: 11 (ref 12–20)
CALCIUM SERPL-MCNC: 8.5 MG/DL (ref 8.5–10.1)
CHLORIDE SERPL-SCNC: 105 MMOL/L (ref 97–108)
CO2 SERPL-SCNC: 27 MMOL/L (ref 21–32)
CREAT SERPL-MCNC: 0.85 MG/DL (ref 0.7–1.3)
GLUCOSE SERPL-MCNC: 115 MG/DL (ref 65–100)
HGB BLD-MCNC: 13.5 G/DL (ref 12.1–17)
POTASSIUM SERPL-SCNC: 4.1 MMOL/L (ref 3.5–5.1)
SODIUM SERPL-SCNC: 141 MMOL/L (ref 136–145)

## 2017-12-14 PROCEDURE — 97165 OT EVAL LOW COMPLEX 30 MIN: CPT | Performed by: OCCUPATIONAL THERAPIST

## 2017-12-14 PROCEDURE — 97116 GAIT TRAINING THERAPY: CPT

## 2017-12-14 PROCEDURE — 80048 BASIC METABOLIC PNL TOTAL CA: CPT | Performed by: PHYSICIAN ASSISTANT

## 2017-12-14 PROCEDURE — 85018 HEMOGLOBIN: CPT | Performed by: PHYSICIAN ASSISTANT

## 2017-12-14 PROCEDURE — 36415 COLL VENOUS BLD VENIPUNCTURE: CPT | Performed by: PHYSICIAN ASSISTANT

## 2017-12-14 PROCEDURE — 74011250637 HC RX REV CODE- 250/637: Performed by: PHYSICIAN ASSISTANT

## 2017-12-14 PROCEDURE — 97161 PT EVAL LOW COMPLEX 20 MIN: CPT

## 2017-12-14 PROCEDURE — 74011250636 HC RX REV CODE- 250/636: Performed by: PHYSICIAN ASSISTANT

## 2017-12-14 PROCEDURE — 97530 THERAPEUTIC ACTIVITIES: CPT

## 2017-12-14 PROCEDURE — 97535 SELF CARE MNGMENT TRAINING: CPT | Performed by: OCCUPATIONAL THERAPIST

## 2017-12-14 RX ADMIN — DOCUSATE SODIUM AND SENNOSIDES 1 TABLET: 8.6; 5 TABLET, FILM COATED ORAL at 10:37

## 2017-12-14 RX ADMIN — ASPIRIN 325 MG: 325 TABLET, DELAYED RELEASE ORAL at 10:37

## 2017-12-14 RX ADMIN — OXYCODONE HYDROCHLORIDE 10 MG: 5 TABLET ORAL at 09:23

## 2017-12-14 RX ADMIN — ACETAMINOPHEN 650 MG: 325 TABLET ORAL at 05:28

## 2017-12-14 RX ADMIN — ACETAMINOPHEN 650 MG: 325 TABLET ORAL at 00:10

## 2017-12-14 RX ADMIN — CEFAZOLIN 2 G: 1 INJECTION, POWDER, FOR SOLUTION INTRAMUSCULAR; INTRAVENOUS; PARENTERAL at 05:28

## 2017-12-14 RX ADMIN — CELECOXIB 200 MG: 100 CAPSULE ORAL at 10:37

## 2017-12-14 RX ADMIN — AMLODIPINE BESYLATE 10 MG: 5 TABLET ORAL at 09:23

## 2017-12-14 RX ADMIN — LOSARTAN POTASSIUM 100 MG: 50 TABLET ORAL at 09:23

## 2017-12-14 RX ADMIN — ATORVASTATIN CALCIUM 80 MG: 20 TABLET, FILM COATED ORAL at 06:32

## 2017-12-14 RX ADMIN — POLYETHYLENE GLYCOL 3350 17 G: 17 POWDER, FOR SOLUTION ORAL at 09:22

## 2017-12-14 RX ADMIN — LEVOTHYROXINE SODIUM 125 MCG: 125 TABLET ORAL at 06:33

## 2017-12-14 RX ADMIN — ACETAMINOPHEN 650 MG: 325 TABLET ORAL at 11:57

## 2017-12-14 RX ADMIN — CEFAZOLIN 2 G: 1 INJECTION, POWDER, FOR SOLUTION INTRAMUSCULAR; INTRAVENOUS; PARENTERAL at 11:57

## 2017-12-14 RX ADMIN — FAMOTIDINE 20 MG: 20 TABLET, FILM COATED ORAL at 10:37

## 2017-12-14 NOTE — PROGRESS NOTES
Nutrition Assessment:    RECOMMENDATIONS/INTERVENTION(S):   Continue Regular diet to promote PO  Start Ensure Enlive QD  Monitor PO intake, labs, skin integrity, wt    ASSESSMENT:   12/14:  Consult received for general nutrition management & supplements. 79 yom admitted for OA L knee. S/p KNEE UNICONDYLAR REPLACEMENT ROBOTIC ASSISTED. Surgical wound to L knee, no edema noted. Labs/meds reviewed. , A1C 6.1%. NS IVF. Diet advanced to Regular. Visited pt this morning. Tolerating diet w/ good appetite, PO. Usual diet is 2 meals/d + 1-2 snacks. Ensure at home for post-surgery. Discussed ortho post-op nutrition recs w/ emphasis on meeting increased protein needs. Pt receptive. Overwt per advanced age. Plans for healthy wt loss through diet and exercise once healing occurs. SUBJECTIVE/OBJECTIVE:     Diet Order: Regular  % Eaten:  Patient Vitals for the past 72 hrs:   % Diet Eaten   12/13/17 1809 75 %     Pertinent Medications: [x] Reviewed - pepcid, miralax, pericolace, zofran, compazine    Past Medical History:   Diagnosis Date    Arthritis     Hypercholesteremia     Hypertension     Ill-defined condition     hyperlipidemia    Ill-defined condition 12/06/2017    obesity  BMI= 30.8    Thyroid disease     Pt states he doesn't know why he is taking this medication. He stated he was never told that he had a problem. Taking this medication for 15 years. Chemistries:  Lab Results   Component Value Date/Time    Sodium 141 12/14/2017 02:51 AM    Potassium 4.1 12/14/2017 02:51 AM    Chloride 105 12/14/2017 02:51 AM    CO2 27 12/14/2017 02:51 AM    Anion gap 9 12/14/2017 02:51 AM    Glucose 115 12/14/2017 02:51 AM    BUN 9 12/14/2017 02:51 AM    Creatinine 0.85 12/14/2017 02:51 AM    BUN/Creatinine ratio 11 12/14/2017 02:51 AM    GFR est AA >60 12/14/2017 02:51 AM    GFR est non-AA >60 12/14/2017 02:51 AM    Calcium 8.5 12/14/2017 02:51 AM    AST (SGOT) 20 12/06/2017 04:05 PM    Alk. phosphatase 81 12/06/2017 04:05 PM    Protein, total 7.2 12/06/2017 04:05 PM    Albumin 4.2 12/06/2017 04:05 PM    Globulin 3.0 12/06/2017 04:05 PM    A-G Ratio 1.4 12/06/2017 04:05 PM    ALT (SGPT) 31 12/06/2017 04:05 PM      Anthropometrics: Height: 5' 10\" (177.8 cm) Weight: 96.2 kg (212 lb 1.3 oz)    IBW (%IBW): 83 kg (182 lb 15.7 oz) (115.9 %) UBW (%UBW):   (  %)    BMI: Body mass index is 30.43 kg/(m^2). This BMI is indicative of:   [] Underweight    [] Normal    [x] Overweight - per advanced age    []  Obesity    []  Extreme Obesity (BMI>40)  Estimated Nutrition Needs (Based on): 2015 Kcals/day (BMR (1743) x 1.3 AF) , 96 g (-115 (1.0-1.2 g/kg x actual BW)) Protein  Carbohydrate: At Least 130 g/day  Fluids: 2000 mL/day    Last BM: 12/13, abd WDL   []Active     []Hyperactive  []Hypoactive       [] Absent   BS - not documented  Skin:    [] Intact   [x] Incision - L knee  [] Breakdown   [] DTI   [] Tears/Excoriation/Abrasion  []Edema [] Other:      Wt Readings from Last 30 Encounters:   12/14/17 96.2 kg (212 lb 1.3 oz)   12/06/17 97.4 kg (214 lb 11.7 oz)   10/22/14 97.6 kg (215 lb 3 oz)   10/15/14 97.5 kg (215 lb)      NUTRITION DIAGNOSES:   Problem:  Increased nutrient needs     Etiology: related to increased need for protein     Signs/Symptoms: as evidenced by impaired skin integrity - surgical wound to L knee      NUTRITION INTERVENTIONS:  Meals/Snacks: General/healthful diet, Modify diet/texture/consistency/nutrients   Supplements: Commercial supplement     Initial/Brief Nutrition Education: Purpose of nutrition education        GOAL:   PO intake of meals & ONS >/= 75% in the next 3-5 days    Cultural, Buddhism, or Ethnic Dietary Needs: None     EDUCATION & DISCHARGE NEEDS:    [] None Identified   [x] Identified and Education Provided/Documented - ortho post-op nutrition recs   [] Identified and Pt declined/was not appropriate      [x] Interdisciplinary Care Plan Reviewed/Documented    [x] Discharge Needs: See D/C note   [] No Nutrition Related Discharge Needs    NUTRITION RISK:   Pt Is At Nutrition Risk  [x]     No Nutrition Risk Identified  []       PT SEEN FOR:    [x]  MD Consult: []Calorie Count      []Diabetic Diet Education        []Diet Education     []Electrolyte Management     [x]General Nutrition Management and Supplements     []Management of Tube Feeding     []TPN Recommendations    []  RN Referral:  []MST score >=2     []Enteral/Parenteral Nutrition PTA     []Pregnant: Gestational DM or Multigestation                 [] Pressure Ulcer    []  Low BMI      []  Length of Stay       [] Dysphagia Diet         [] Ventilator  []  Follow-up     Previous Recommendations:   [] Implemented          [] Not Implemented          [x] Not Applicable    Previous Goal:   [] Met              [] Progressing Towards Goal              [] Not Progressing Towards Goal   [x] Not Applicable            Jm Prakash, [address] [place] [address]  Pager [phone]

## 2017-12-14 NOTE — PROGRESS NOTES
physical Therapy EVALUATION/DISCHARGE  Patient: Sulma Coyle. (78 y.o. male)  Date: 12/14/2017  Primary Diagnosis: LEFT KNEE PAIN  Primary osteoarthritis of left knee  S/P knee replacement  Procedure(s) (LRB):  KNEE UNICONDYLAR REPLACEMENT ROBOTIC ASSISTED (Left) 1 Day Post-Op   Precautions:  WBAT, Fall  ASSESSMENT :  Based on the objective data described below, the patient presents with decreased ROM and strength to LLE, and decreased transfers and gait following admission for left [facility]. Patient was received already seated at edge of bed working with OT. He was able to stand and ambulate 75 feet with rolling walker and CGA; no buckling noted. He performed all bed exercises including SLR and SAQ with supervision. Patient also went up and down 4 steps with CGA. Vitals stable. Patient had his right knee done in 2014. He lives alone but son will stay and patient has also arranged for paid caregiver if needed. Patient is safe for discharge from a PT standpoint. He will benefit from HHPT upon discharge. He has all needed DME . PLAN :  Discharge Recommendations: Home Health  Further Equipment Recommendations for Discharge: none     SUBJECTIVE:   Patient stated This is going much better than last time.     OBJECTIVE DATA SUMMARY:   HISTORY:    Past Medical History:   Diagnosis Date    Arthritis     Hypercholesteremia     Hypertension     Ill-defined condition     hyperlipidemia    Ill-defined condition 12/06/2017    obesity  BMI= 30.8    Thyroid disease     Pt states he doesn't know why he is taking this medication. He stated he was never told that he had a problem. Taking this medication for 15 years. Past Surgical History:   Procedure Laterality Date    HX HEENT      eye surgery x2 -once in childhood, again in 2005    HX KNEE REPLACEMENT Right 10/2014    HX TONSILLECTOMY      child     Prior Level of Function/Home Situation: independent; still works.    Personal factors and/or comorbidities impacting plan of care: none    Home Situation  Home Environment: Private residence  # Steps to Enter: 3  Rails to Enter: Yes  Hand Rails : Bilateral  One/Two Story Residence: One story  Living Alone: Yes  Support Systems: Family member(s), Worship / yudi community  Patient Expects to be Discharged to[de-identified] Private residence  Current DME Used/Available at Home: Blood pressure cuff, Cane, quad, Commode, bedside, Raised toilet seat, Walker  Tub or Shower Type: Shower    EXAMINATION/PRESENTATION/DECISION MAKING:   Critical Behavior:  Neurologic State: Alert  Orientation Level: Oriented X4  Cognition: Follows commands, Appropriate decision making, Appropriate for age attention/concentration, Appropriate safety awareness  Safety/Judgement: Awareness of environment, Fall prevention, Home safety, Insight into deficits  Hearing: Auditory  Auditory Impairment: None    Range Of Motion:  AROM: Within functional limits        LLE AROM  L Knee Flexion: 95  L Knee Extension: 5  PROM: Within functional limits           Strength:    Strength: Within functional limits                    Tone & Sensation:   Tone: Normal              Sensation: Intact               Coordination:  Coordination: Within functional limits  Vision:   Corrective Lenses: Glasses  Functional Mobility:  Bed Mobility:     Supine to Sit: Independent  Sit to Supine: Independent  Scooting: Independent  Transfers:  Sit to Stand: Modified independent  Stand to Sit: Modified independent        Bed to Chair: Supervision              Balance:   Sitting: Intact; Without support  Standing: Intact; With support  Ambulation/Gait Training:  Distance (ft): 75 Feet (ft)  Assistive Device: Gait belt;Walker, rolling  Ambulation - Level of Assistance: Contact guard assistance        Gait Abnormalities: Step to gait     Left Side Weight Bearing: As tolerated                                     Stairs:  Number of Stairs Trained: 4  Stairs - Level of Assistance: Contact guard assistance   Rail Use: Both     Therapeutic Exercises: Ankle pumps, quad sets, heel slides, SAQ, SLR       Physical Therapy Evaluation Charge Determination   History Examination Presentation Decision-Making   LOW Complexity : Zero comorbidities / personal factors that will impact the outcome / POC LOW Complexity : 1-2 Standardized tests and measures addressing body structure, function, activity limitation and / or participation in recreation  LOW Complexity : Stable, uncomplicated  LOW Complexity : FOTO score of       Based on the above components, the patient evaluation is determined to be of the following complexity level: LOW     Pain:   Left knee 4/10. Activity Tolerance:   good  Please refer to the flowsheet for vital signs taken during this treatment. After treatment:   [x]   Patient left in no apparent distress sitting up in chair  []   Patient left in no apparent distress in bed  [x]   Call bell left within reach  [x]   Nursing notified  []   Caregiver present  []   Bed alarm activated    COMMUNICATION/EDUCATION:   Communication/Collaboration:  [x]   Fall prevention education was provided and the patient/caregiver indicated understanding. [x]   Patient/family have participated as able and agree with findings and recommendations. []   Patient is unable to participate in plan of care at this time.   Findings and recommendations were discussed with: Occupational Therapist and Registered Nurse    Thank you for this referral.  Mary Jolly, PT   Time Calculation: 39 mins

## 2017-12-14 NOTE — PROGRESS NOTES
Spiritual Care Partner Volunteer visited patient in Med Surg 1 on 12/14/17.   Documented by:  Maricarmen Tubbs [id number] [facility] [place] ([id number])

## 2017-12-14 NOTE — PROGRESS NOTES
Problem: Knee Replacement: Day of Surgery/Unit  Goal: Activity/Safety  Outcome: Progressing Towards Goal  Bed locked in lowest position call light in reach, pt verbalize understanding to call for assistance, pt sat EOO

## 2017-12-14 NOTE — PROGRESS NOTES
Patient handed multiple scripts including a script for Tramadol and Oxycodone IR. Nurse handed patient a copy of discharge instructions and a Summary of Care. All instructions and medications read and explained to him with his son at his side.  Verbalized understanding

## 2017-12-14 NOTE — INTERDISCIPLINARY ROUNDS
[address]    Patient Information    Name: Kristy Ellis. Age: 79 y.o. Admission Date: 12/13/2017  Surgery/Procedure: Procedure(s):  KNEE UNICONDYLAR REPLACEMENT ROBOTIC ASSISTED  Attending Provider: Princess Martins MD  Surgeon: Yasmine Quiñones   Problem List: Principal Problem:    Primary osteoarthritis of left knee (12/12/2017)      Overview: LEFT UNICOMPARTMENTAL KNEE REPLACEMENT (Chay Oka) 12/13/17    Active Problems:    S/P knee replacement (12/13/2017)        During rounds the following quality care indicators and evidence based practices were addressed :       PT/OT: Patient mobility - Patient has been cleared for discharge by physical and occupational therapy. Bed Mobility Training  Supine to Sit: Independent  Sit to Supine: Independent  Scooting: Independent  Transfer Training  Sit to Stand: Modified independent  Stand to Sit: Modified independent  Bed to Chair: Supervision      Gait Training  Assistive Device: Gait belt, Walker, rolling  Ambulation - Level of Assistance: Contact guard assistance  Distance (ft): 75 Feet (ft)  Stairs - Level of Assistance: Contact guard assistance  Number of Stairs Trained: 4  Rail Use: Both   Weight Bearing Status  Left Side Weight Bearing: As tolerated      Pain Assessment  Pain Intensity 1: 0 (12/13/17 2146)  Pain Location 1: Head  Pain Intervention(s) 1: Family Support, Elevation, Rest  Patient Stated Pain Goal: 3    Pain meds: oxycodone  Antibiotics completed: One ancef left  Anticoagulation: ASA bid  Bowel regimen: yes  Mechanical DVT prophylaxis: SCDs  More: N    RRAT Score:      Readmit Risk Tool  Support Systems: Family member(s), [employer]ism / yudi community  Relationship with Primary Physician Group: Seen at least one time within past 12 months    Discharge Management/Planning:    Readmit Risk Assessment Information:   Low Risk            5       Total Score        5 Pt.  Coverage (Medicare=5 , Medicaid, or Self-Pay=4) Criteria that do not apply:    Has Seen PCP in Last 6 Months (Yes=3, No=0)    . Living with Significant Other. Assisted Living. LTAC. SNF. or   Rehab    Patient Length of Stay (>5 days = 3)    IP Visits Last 12 Months (1-3=4, 4=9, >4=11)    Charlson Comorbidity Score (Age + Comorbid Conditions)         Readmit Risk Tool  Support Systems: Family member(s), Confucianism / yudi community  Relationship with Primary Physician Group: Seen at least one time within past 12 months    Anticipated Date of Discharge: today    Interdisciplinary team rounds were held with the following team members: Nurse Practitioner, Nursing, Pharmacy, and Rehab. See clinical pathway and/or care plan for interventions and desired outcomes.

## 2017-12-14 NOTE — PROGRESS NOTES
Problem: Patient Education: Go to Patient Education Activity  Goal: Patient/Family Education  Occupational Therapy EVALUATION/discharge  Patient: Lisa Lazo. (78 y.o. male)  Date: 12/14/2017  Primary Diagnosis: LEFT KNEE PAIN  Primary osteoarthritis of left knee  S/P knee replacement  Procedure(s) (LRB):  KNEE UNICONDYLAR REPLACEMENT ROBOTIC ASSISTED (Left) 1 Day Post-Op   Precautions: WBAT       ASSESSMENT:   Based on the objective data described below, the patient presents with good activity tolerance, minimal complaint of pain, has all adaptive equipment from prior sx. Cues to prevent twisting but showed good carryover. Family to stay with him initially and on target for discharge. Further skilled acute occupational therapy is not indicated at this time. Discharge Recommendations: None  Further Equipment Recommendations for Discharge: none      SUBJECTIVE:   Patient stated I'm occupied, I don't need OT.    OBJECTIVE DATA SUMMARY:   HISTORY:   Past Medical History:   Diagnosis Date    Arthritis     Hypercholesteremia     Hypertension     Ill-defined condition     hyperlipidemia    Ill-defined condition 12/06/2017    obesity  BMI= 30.8    Thyroid disease     Pt states he doesn't know why he is taking this medication. He stated he was never told that he had a problem. Taking this medication for 15 years.      Past Surgical History:   Procedure Laterality Date    HX HEENT      eye surgery x2 -once in childhood, again in 2005    HX KNEE REPLACEMENT Right 10/2014    HX TONSILLECTOMY      child       Prior Level of Function/Environment/Context: independent, pain in left knee x's 1 year, right TKR 3 years ago, last worked end of October  Occupations in which the patient is/was successful, what are the barriers preventing that success:   Performance Patterns (routines, roles, habits, and rituals):   Personal Interests and/or values: going to festivals  Expanded or extensive additional review of patient history:     Home Situation  Home Environment: Private residence  # Steps to Enter: 3  One/Two Story Residence: One story  Living Alone: Yes  Support Systems: Pin or Peg / y[facility]  Patient Expects to be Discharged to[de-identified] Private residence  Current DME Used/Available at Home: Blood pressure cuff, Cane, quad, Commode, bedside, Raised toilet seat, Walker  Tub or Shower Type: Shower  []  Right hand dominant   []  Left hand dominant    EXAMINATION OF PERFORMANCE DEFICITS:  Cognitive/Behavioral Status:  Neurologic State: Alert  Orientation Level: Oriented X4  Cognition: Follows commands; Appropriate decision making; Appropriate for age attention/concentration; Appropriate safety awareness  Perception: Appears intact  Perseveration: No perseveration noted  Safety/Judgement: Awareness of environment; Fall prevention;Home safety; Insight into deficits    Skin: dressing intact    Edema: minimal left LE    Hearing: Auditory  Auditory Impairment: None    Vision/Perceptual:                                Corrective Lenses: Glasses    Range of Motion:  AROM: Within functional limits  PROM: Within functional limits                      Strength:  Strength: Within functional limits                Coordination:  Coordination: Within functional limits  Fine Motor Skills-Upper: Right Intact; Left Intact    Gross Motor Skills-Upper: Left Intact; Right Intact    Tone & Sensation:  Tone: Normal  Sensation: Intact                      Balance:  Sitting: Intact; Without support  Standing: Intact; With support    Functional Mobility and Transfers for ADLs:  Bed Mobility:  Supine to Sit: Independent  Sit to Supine: Independent  Scooting: Independent    Transfers:  Sit to Stand: Modified independent  Stand to Sit: Modified independent  Bed to Chair: Supervision  Toilet Transfer : Supervision    ADL Assessment:  Feeding: Independent    Oral Facial Hygiene/Grooming: Modified Independent    Bathing: Supervision    Upper Body Dressing: Independent    Lower Body Dressing: Modified independent    Toileting: Modified independent                ADL Intervention and task modifications:         educated on role of OT, positioning in supine and seated, sequencing for LE dressing, positions to avoid and maneuvering rolling walker, educated on pain management     Cognitive Retraining  Safety/Judgement: Awareness of environment; Fall prevention;Home safety; Insight into deficits         Functional Measure:  Barthel Index:    Bathin  Bladder: 10  Bowels: 10  Groomin  Dressing: 10  Feeding: 10  Mobility: 10  Stairs: 5  Toilet Use: 10  Transfer (Bed to Chair and Back): 10  Total: 85       Barthel and G-code impairment scale:  Percentage of impairment CH  0% CI  1-19% CJ  20-39% CK  40-59% CL  60-79% CM  80-99% CN  100%   Barthel Score 0-100 100 99-80 79-60 59-40 20-39 1-19   0   Barthel Score 0-20 20 17-19 13-16 9-12 5-8 1-4 0      The Barthel ADL Index: Guidelines  1. The index should be used as a record of what a patient does, not as a record of what a patient could do. 2. The main aim is to establish degree of independence from any help, physical or verbal, however minor and for whatever reason. 3. The need for supervision renders the patient not independent. 4. A patient's performance should be established using the best available evidence. Asking the patient, friends/relatives and nurses are the usual sources, but direct observation and common sense are also important. However direct testing is not needed. 5. Usually the patient's performance over the preceding 24-48 hours is important, but occasionally longer periods will be relevant. 6. Middle categories imply that the patient supplies over 50 per cent of the effort. 7. Use of aids to be independent is allowed. Ashanti Nowak., Barthel, D.W. (). Functional evaluation: the Barthel Index. [address] [facility] (14)2. TOM Ashraf, Sharonda Carter.Daryn.Kayley, [address] [place] [address] (). Measuring the change indisability after inpatient rehabilitation; comparison of the responsiveness of the Barthel Index and Functional Bryan Measure. Journal of Neurology, Neurosurgery, and Psychiatry, 66(4), 667-682. RUPA Root, FRANCO Wolf, & Chayo Hoffmna M.A. (2004.) Assessment of post-stroke quality of life in cost-effectiveness studies: The usefulness of the Barthel Index and the EuroQoL-5D. Quality of Life Research, 13, 348-73         G codes: In compliance with CMSs Claims Based Outcome Reporting, the following G-code set was chosen for this patient based on their primary functional limitation being treated: The outcome measure chosen to determine the severity of the functional limitation was the Barthel Index with a score of 85/100 which was correlated with the impairment scale. ? Self Care:     - CURRENT STATUS: CI - 1%-19% impaired, limited or restricted    - GOAL STATUS: CI - 1%-19% impaired, limited or restricted    - D/C STATUS:  CI - 1%-19% impaired, limited or restricted     Occupational Therapy Evaluation Charge Determination   History Examination Decision-Making   LOW Complexity : Brief history review  LOW Complexity : 1-3 performance deficits relating to physical, cognitive , or psychosocial skils that result in activity limitations and / or participation restrictions  LOW Complexity : No comorbidities that affect functional and no verbal or physical assistance needed to complete eval tasks       Based on the above components, the patient evaluation is determined to be of the following complexity level: LOW   Pain:      minimal complaint, meds given              Activity Tolerance:   Good   Please refer to the flowsheet for vital signs taken during this treatment.   After treatment:   [x]  Patient left in no apparent distress sitting up in chair  []  Patient left in no apparent distress in bed  [x]  Call bell left within reach  [x]  Nursing notified  []  Caregiver present  []  Bed alarm activated    COMMUNICATION/EDUCATION:   Communication/Collaboration:  [x]      Home safety education was provided and the patient/caregiver indicated understanding. [x]      Patient/family have participated as able and agree with findings and recommendations. []      Patient is unable to participate in plan of care at this time.   Findings and recommendations were discussed with: Physical Therapist and Registered Nurse    Alexandra White OTR/L  Time Calculation: 24 mins

## 2017-12-14 NOTE — PROGRESS NOTES
Pt has been accepted by [facility] for home health. Mary Porter LCSW    Pt is being discharged today. AVS faxed to [facility].   Mary Porter LCSW

## 2017-12-14 NOTE — PROGRESS NOTES
Problem: Knee Replacement: Post-Op Day 1  Goal: *Demonstrates progressive activity  Outcome: Resolved/Met Date Met: 12/14/17  Ambulated with Rehab today and was cleared for discharge to home. Goal: *Optimal pain control at patient's stated goal  Outcome: Progressing Towards Goal  Tolerating prn analgesic regime; stated pain goal = 3. Goal: *Discharge plan identified  Outcome: Progressing Towards Goal  Discharge to home today, patient has cleared rehab and pain controlled. Discharge instruction with prescriptions ready from surgeon.

## 2017-12-20 NOTE — DISCHARGE SUMMARY
@7UFFS@ [address] [place] [address]    DISCHARGE SUMMARY     Patient: Sulma Olvera Medical Record Number: [id number]                : 1950  Age: 79 y.o. Admit Date: 2017  Discharge Date: 2017    Admission Diagnosis: LEFT KNEE PAIN  Primary osteoarthritis of left knee  S/P knee replacement  Discharge Diagnosis: LEFT KNEE PAIN    Procedures: Procedure(s):  KNEE UNICONDYLAR REPLACEMENT ROBOTIC ASSISTED    Surgeon: Jasper Nayak MD  Assistants: Isa Brooks PA-C    Anesthesia: spinal  Complications: None     History of Present Illness:  Sulma Olvera is a 79 y.o. male with a history of Left knee pain, swelling, and marked loss of function. Despite conservative management and after clinical and radiographic evaluation, it was determined that he suffered from end-stage osteoarthritis and would benefit from Procedure(s):  LEFT KNEE UNICONDYLAR REPLACEMENT ROBOTIC ASSISTED, which he consented to undergo after a discussion of the risks, benefits, alternatives, rehab concerns, and potential complications of surgery. Hospital Course:  Sulma Olvera tolerated the procedure well. He was transferred  to the recovery room in stable condition. After a brief stay the patient was then transferred to the Joint Replacement Unit at [address] [place] [address]. On postoperative day #1, the dressing was clean and dry, he was neurovascularly intact. The patient was afebrile and vital signs were stable. Calves were soft and non-tender bilaterally. The patient was tolerating a regular diet and made satisfactory progress with physical therapy.       Hemoglobin and INR prior to discharge were   Lab Results   Component Value Date/Time    HGB 13.5 2017 02:51 AM    INR 1.0 10/15/2014 11:56 AM       Sulma Olvera was cleared by physical therapy and was discharged to Home in stable condition on the day of surgery. He was provided with routine postoperative instructions and advised to follow up in my office in 2 weeks following discharge from the hospital.  He was prescribed aspirin for DVT prophylaxis, tramadol and oxycodone for post-operative pain, dulcolax suppository for constipation, and zofran for nausea. Discharge Medications:  Cannot display discharge medications since this patient is not currently admitted.       Signed by: Yessi Salas MD  12/19/2017

## 2018-01-18 ENCOUNTER — APPOINTMENT (OUTPATIENT)
Dept: CT IMAGING | Age: 68
End: 2018-01-18
Attending: EMERGENCY MEDICINE
Payer: COMMERCIAL

## 2018-01-18 ENCOUNTER — HOSPITAL ENCOUNTER (EMERGENCY)
Age: 68
Discharge: HOME | End: 2018-01-18
Attending: EMERGENCY MEDICINE | Admitting: EMERGENCY MEDICINE
Payer: COMMERCIAL

## 2018-01-18 ENCOUNTER — APPOINTMENT (OUTPATIENT)
Dept: GENERAL RADIOLOGY | Age: 68
End: 2018-01-18
Attending: EMERGENCY MEDICINE
Payer: COMMERCIAL

## 2018-01-18 VITALS
HEART RATE: 87 BPM | DIASTOLIC BLOOD PRESSURE: 98 MMHG | TEMPERATURE: 98.7 F | RESPIRATION RATE: 15 BRPM | BODY MASS INDEX: 30.06 KG/M2 | WEIGHT: 210 LBS | SYSTOLIC BLOOD PRESSURE: 164 MMHG | HEIGHT: 70 IN | OXYGEN SATURATION: 98 %

## 2018-01-18 DIAGNOSIS — I15.9 SECONDARY HYPERTENSION: Primary | ICD-10-CM

## 2018-01-18 LAB
ALBUMIN SERPL-MCNC: 4.2 G/DL (ref 3.5–5)
ALBUMIN/GLOB SERPL: 1.2 {RATIO} (ref 1.1–2.2)
ALP SERPL-CCNC: 84 U/L (ref 45–117)
ALT SERPL-CCNC: 33 U/L (ref 12–78)
ANION GAP SERPL CALC-SCNC: 11 MMOL/L (ref 5–15)
APPEARANCE UR: CLEAR
AST SERPL-CCNC: 19 U/L (ref 15–37)
BACTERIA URNS QL MICRO: NEGATIVE /HPF
BASOPHILS # BLD: 0 K/UL (ref 0–0.1)
BASOPHILS NFR BLD: 0 % (ref 0–1)
BILIRUB SERPL-MCNC: 0.4 MG/DL (ref 0.2–1)
BILIRUB UR QL: NEGATIVE
BNP SERPL-MCNC: 64 PG/ML (ref 0–125)
BUN SERPL-MCNC: 9 MG/DL (ref 6–20)
BUN/CREAT SERPL: 11 (ref 12–20)
CALCIUM SERPL-MCNC: 9.2 MG/DL (ref 8.5–10.1)
CHLORIDE SERPL-SCNC: 98 MMOL/L (ref 97–108)
CK MB CFR SERPL CALC: 1.7 % (ref 0–2.5)
CK MB SERPL-MCNC: 1.4 NG/ML (ref 5–25)
CK SERPL-CCNC: 82 U/L (ref 39–308)
CO2 SERPL-SCNC: 28 MMOL/L (ref 21–32)
COLOR UR: NORMAL
CREAT SERPL-MCNC: 0.85 MG/DL (ref 0.7–1.3)
EOSINOPHIL # BLD: 0.2 K/UL (ref 0–0.4)
EOSINOPHIL NFR BLD: 2 % (ref 0–7)
EPITH CASTS URNS QL MICRO: NORMAL /LPF
ERYTHROCYTE [DISTWIDTH] IN BLOOD BY AUTOMATED COUNT: 12.3 % (ref 11.5–14.5)
GLOBULIN SER CALC-MCNC: 3.5 G/DL (ref 2–4)
GLUCOSE SERPL-MCNC: 133 MG/DL (ref 65–100)
GLUCOSE UR STRIP.AUTO-MCNC: NEGATIVE MG/DL
HCT VFR BLD AUTO: 38.8 % (ref 36.6–50.3)
HGB BLD-MCNC: 13.6 G/DL (ref 12.1–17)
HGB UR QL STRIP: NEGATIVE
KETONES UR QL STRIP.AUTO: NEGATIVE MG/DL
LEUKOCYTE ESTERASE UR QL STRIP.AUTO: NEGATIVE
LIPASE SERPL-CCNC: 141 U/L (ref 73–393)
LYMPHOCYTES # BLD: 1 K/UL (ref 0.8–3.5)
LYMPHOCYTES NFR BLD: 12 % (ref 12–49)
MCH RBC QN AUTO: 31.9 PG (ref 26–34)
MCHC RBC AUTO-ENTMCNC: 35.1 G/DL (ref 30–36.5)
MCV RBC AUTO: 91.1 FL (ref 80–99)
MONOCYTES # BLD: 0.7 K/UL (ref 0–1)
MONOCYTES NFR BLD: 9 % (ref 5–13)
NEUTS SEG # BLD: 6.3 K/UL (ref 1.8–8)
NEUTS SEG NFR BLD: 77 % (ref 32–75)
NITRITE UR QL STRIP.AUTO: NEGATIVE
PH UR STRIP: 6 [PH] (ref 5–8)
PLATELET # BLD AUTO: 287 K/UL (ref 150–400)
POTASSIUM SERPL-SCNC: 3.7 MMOL/L (ref 3.5–5.1)
PROT SERPL-MCNC: 7.7 G/DL (ref 6.4–8.2)
PROT UR STRIP-MCNC: NEGATIVE MG/DL
RBC # BLD AUTO: 4.26 M/UL (ref 4.1–5.7)
RBC #/AREA URNS HPF: NORMAL /HPF (ref 0–5)
SODIUM SERPL-SCNC: 137 MMOL/L (ref 136–145)
SP GR UR REFRACTOMETRY: <1.005 (ref 1–1.03)
TROPONIN I SERPL-MCNC: <0.04 NG/ML
UROBILINOGEN UR QL STRIP.AUTO: 0.2 EU/DL (ref 0.2–1)
WBC # BLD AUTO: 8.2 K/UL (ref 4.1–11.1)
WBC URNS QL MICRO: NORMAL /HPF (ref 0–4)

## 2018-01-18 PROCEDURE — 70450 CT HEAD/BRAIN W/O DYE: CPT

## 2018-01-18 PROCEDURE — 74011250637 HC RX REV CODE- 250/637: Performed by: EMERGENCY MEDICINE

## 2018-01-18 PROCEDURE — 93005 ELECTROCARDIOGRAM TRACING: CPT

## 2018-01-18 PROCEDURE — 83880 ASSAY OF NATRIURETIC PEPTIDE: CPT | Performed by: EMERGENCY MEDICINE

## 2018-01-18 PROCEDURE — 36415 COLL VENOUS BLD VENIPUNCTURE: CPT | Performed by: EMERGENCY MEDICINE

## 2018-01-18 PROCEDURE — 83690 ASSAY OF LIPASE: CPT | Performed by: EMERGENCY MEDICINE

## 2018-01-18 PROCEDURE — 81001 URINALYSIS AUTO W/SCOPE: CPT | Performed by: EMERGENCY MEDICINE

## 2018-01-18 PROCEDURE — 71045 X-RAY EXAM CHEST 1 VIEW: CPT

## 2018-01-18 PROCEDURE — 94762 N-INVAS EAR/PLS OXIMTRY CONT: CPT

## 2018-01-18 PROCEDURE — 84484 ASSAY OF TROPONIN QUANT: CPT | Performed by: EMERGENCY MEDICINE

## 2018-01-18 PROCEDURE — 99284 EMERGENCY DEPT VISIT MOD MDM: CPT

## 2018-01-18 PROCEDURE — 82550 ASSAY OF CK (CPK): CPT | Performed by: EMERGENCY MEDICINE

## 2018-01-18 PROCEDURE — 80053 COMPREHEN METABOLIC PANEL: CPT | Performed by: EMERGENCY MEDICINE

## 2018-01-18 PROCEDURE — 85025 COMPLETE CBC W/AUTO DIFF WBC: CPT | Performed by: EMERGENCY MEDICINE

## 2018-01-18 RX ORDER — CLONIDINE HYDROCHLORIDE 0.1 MG/1
0.2 TABLET ORAL
Status: COMPLETED | OUTPATIENT
Start: 2018-01-18 | End: 2018-01-18

## 2018-01-18 RX ADMIN — CLONIDINE HYDROCHLORIDE 0.2 MG: 0.1 TABLET ORAL at 21:33

## 2018-01-19 LAB
ATRIAL RATE: 95 BPM
CALCULATED P AXIS, ECG09: 53 DEGREES
CALCULATED R AXIS, ECG10: -11 DEGREES
CALCULATED T AXIS, ECG11: 41 DEGREES
DIAGNOSIS, 93000: NORMAL
P-R INTERVAL, ECG05: 128 MS
Q-T INTERVAL, ECG07: 368 MS
QRS DURATION, ECG06: 90 MS
QTC CALCULATION (BEZET), ECG08: 462 MS
VENTRICULAR RATE, ECG03: 95 BPM

## 2018-01-19 NOTE — ED TRIAGE NOTES
Pt c/o having knee replacement in the last month. Pt denies any chest pain, no SOB, no calf pain. BP has been elevated at home.

## 2018-01-19 NOTE — DISCHARGE INSTRUCTIONS
High Blood Pressure: Care Instructions  Your Care Instructions    If your blood pressure is usually above 140/90, you have high blood pressure, or hypertension. That means the top number is 140 or higher or the bottom number is 90 or higher, or both. Despite what a lot of people think, high blood pressure usually doesn't cause headaches or make you feel dizzy or lightheaded. It usually has no symptoms. But it does increase your risk for heart attack, stroke, and kidney or eye damage. The higher your blood pressure, the more your risk increases. Your doctor will give you a goal for your blood pressure. Your goal will be based on your health and your age. An example of a goal is to keep your blood pressure below 140/90. Lifestyle changes, such as eating healthy and being active, are always important to help lower blood pressure. You might also take medicine to reach your blood pressure goal.  Follow-up care is a key part of your treatment and safety. Be sure to make and go to all appointments, and call your doctor if you are having problems. It's also a good idea to know your test results and keep a list of the medicines you take. How can you care for yourself at home? Medical treatment  · If you stop taking your medicine, your blood pressure will go back up. You may take one or more types of medicine to lower your blood pressure. Be safe with medicines. Take your medicine exactly as prescribed. Call your doctor if you think you are having a problem with your medicine. · Talk to your doctor before you start taking aspirin every day. Aspirin can help certain people lower their risk of a heart attack or stroke. But taking aspirin isn't right for everyone, because it can cause serious bleeding. · See your doctor regularly. You may need to see the doctor more often at first or until your blood pressure comes down.   · If you are taking blood pressure medicine, talk to your doctor before you take decongestants or anti-inflammatory medicine, such as ibuprofen. Some of these medicines can raise blood pressure. · Learn how to check your blood pressure at home. Lifestyle changes  · Stay at a healthy weight. This is especially important if you put on weight around the waist. Losing even 10 pounds can help you lower your blood pressure. · If your doctor recommends it, get more exercise. Walking is a good choice. Bit by bit, increase the amount you walk every day. Try for at least 30 minutes on most days of the week. You also may want to swim, bike, or do other activities. · Avoid or limit alcohol. Talk to your doctor about whether you can drink any alcohol. · Try to limit how much sodium you eat to less than 2,300 milligrams (mg) a day. Your doctor may ask you to try to eat less than 1,500 mg a day. · Eat plenty of fruits (such as bananas and oranges), vegetables, legumes, whole grains, and low-fat dairy products. · Lower the amount of saturated fat in your diet. Saturated fat is found in animal products such as milk, cheese, and meat. Limiting these foods may help you lose weight and also lower your risk for heart disease. · Do not smoke. Smoking increases your risk for heart attack and stroke. If you need help quitting, talk to your doctor about stop-smoking programs and medicines. These can increase your chances of quitting for good. When should you call for help? Call 911 anytime you think you may need emergency care. This may mean having symptoms that suggest that your blood pressure is causing a serious heart or blood vessel problem. Your blood pressure may be over 180/110. ? For example, call 911 if:  ? · You have symptoms of a heart attack. These may include:  ¨ Chest pain or pressure, or a strange feeling in the chest.  ¨ Sweating. ¨ Shortness of breath. ¨ Nausea or vomiting.   ¨ Pain, pressure, or a strange feeling in the back, neck, jaw, or upper belly or in one or both shoulders or arms.  ¨ Lightheadedness or sudden weakness. ¨ A fast or irregular heartbeat. ? · You have symptoms of a stroke. These may include:  ¨ Sudden numbness, tingling, weakness, or loss of movement in your face, arm, or leg, especially on only one side of your body. ¨ Sudden vision changes. ¨ Sudden trouble speaking. ¨ Sudden confusion or trouble understanding simple statements. ¨ Sudden problems with walking or balance. ¨ A sudden, severe headache that is different from past headaches. ? · You have severe back or belly pain. ?Do not wait until your blood pressure comes down on its own. Get help right away. ?Call your doctor now or seek immediate care if:  ? · Your blood pressure is much higher than normal (such as 180/110 or higher), but you don't have symptoms. ? · You think high blood pressure is causing symptoms, such as:  ¨ Severe headache. ¨ Blurry vision. ? Watch closely for changes in your health, and be sure to contact your doctor if:  ? · Your blood pressure measures 140/90 or higher at least 2 times. That means the top number is 140 or higher or the bottom number is 90 or higher, or both. ? · You think you may be having side effects from your blood pressure medicine. ? · Your blood pressure is usually normal, but it goes above normal at least 2 times. Where can you learn more? Go to http://jhonathan-anahi.info/. Enter L318 in the search box to learn more about \"High Blood Pressure: Care Instructions. \"  Current as of: September 21, 2016  Content Version: 11.4  © 4594-1985 [employer]. Care instructions adapted under license by [employer] (which disclaims liability or warranty for this information). If you have questions about a medical condition or this instruction, always ask your healthcare professional. [place] [address] any warranty or liability for your use of this information.            We hope that we have addressed all of your medical concerns. The examination and treatment you received in the Emergency Department were for an emergent problem and were not intended as complete care. It is important that you follow up with your healthcare provider(s) for ongoing care. If your symptoms worsen or do not improve as expected, and you are unable to reach your usual health care provider(s), you should return to the Emergency Department. Today's healthcare is undergoing tremendous change, and patient satisfaction surveys are one of the many tools to assess the quality of medical care. You may receive a survey from the [employer] regarding your experience in the Emergency Department. I hope that your experience has been completely positive, particularly the medical care that I provided. As such, please participate in the survey; anything less than excellent does not meet my expectations or intentions. [facility] and [facility] participate in nationally recognized quality of care measures. If your blood pressure is greater than 120/80, as reported below, we urge that you seek medical care to address the potential of high blood pressure, commonly known as hypertension. Hypertension can be hereditary or can be caused by certain medical conditions, pain, stress, or \"white coat syndrome. \"       Please make an appointment with your health care provider(s) for follow up of your Emergency Department visit. VITALS:   Patient Vitals for the past 8 hrs:   Temp Pulse Resp BP SpO2   01/18/18 2204 - 87 15 (!) 164/98 98 %   01/18/18 2145 - - - - 96 %   01/18/18 2145 - 88 18 (!) 179/94 95 %   01/18/18 2130 - 92 19 (!) 179/100 -   01/18/18 2058 98.7 °F (37.1 °C) (!) 104 18 (!) 188/98 96 %          Thank you for allowing us to provide you with medical care today. We realize that you have many choices for your emergency care needs.   Please choose us in the future for any continued health care needs. Andrea [address], [address] [facility].   Office: [phone]            Recent Results (from the past 24 hour(s))   URINALYSIS W/MICROSCOPIC    Collection Time: 01/18/18  9:29 PM   Result Value Ref Range    Color YELLOW/STRAW      Appearance CLEAR CLEAR      Specific gravity <1.005 1.003 - 1.030    pH (UA) 6.0 5.0 - 8.0      Protein NEGATIVE  NEG mg/dL    Glucose NEGATIVE  NEG mg/dL    Ketone NEGATIVE  NEG mg/dL    Bilirubin NEGATIVE  NEG      Blood NEGATIVE  NEG      Urobilinogen 0.2 0.2 - 1.0 EU/dL    Nitrites NEGATIVE  NEG      Leukocyte Esterase NEGATIVE  NEG      WBC PENDING /hpf    RBC PENDING /hpf    Epithelial cells PENDING /lpf    Bacteria PENDING /hpf   LIPASE    Collection Time: 01/18/18  9:29 PM   Result Value Ref Range    Lipase 141 73 - 372 U/L   METABOLIC PANEL, COMPREHENSIVE    Collection Time: 01/18/18  9:29 PM   Result Value Ref Range    Sodium 137 136 - 145 mmol/L    Potassium 3.7 3.5 - 5.1 mmol/L    Chloride 98 97 - 108 mmol/L    CO2 28 21 - 32 mmol/L    Anion gap 11 5 - 15 mmol/L    Glucose 133 (H) 65 - 100 mg/dL    BUN 9 6 - 20 MG/DL    Creatinine 0.85 0.70 - 1.30 MG/DL    BUN/Creatinine ratio 11 (L) 12 - 20      GFR est AA >60 >60 ml/min/1.73m2    GFR est non-AA >60 >60 ml/min/1.73m2    Calcium 9.2 8.5 - 10.1 MG/DL    Bilirubin, total 0.4 0.2 - 1.0 MG/DL    ALT (SGPT) 33 12 - 78 U/L    AST (SGOT) 19 15 - 37 U/L    Alk.  phosphatase 84 45 - 117 U/L    Protein, total 7.7 6.4 - 8.2 g/dL    Albumin 4.2 3.5 - 5.0 g/dL    Globulin 3.5 2.0 - 4.0 g/dL    A-G Ratio 1.2 1.1 - 2.2     CK W/ CKMB & INDEX    Collection Time: 01/18/18  9:29 PM   Result Value Ref Range    CK 82 39 - 308 U/L    CK - MB 1.4 <3.6 NG/ML    CK-MB Index 1.7 0 - 2.5     CBC WITH AUTOMATED DIFF    Collection Time: 01/18/18  9:29 PM   Result Value Ref Range    WBC 8.2 4.1 - 11.1 K/uL    RBC 4.26 4.10 - 5.70 M/uL    HGB 13.6 12.1 - 17.0 g/dL    HCT 38.8 36.6 - 50.3 %    MCV 91.1 80.0 - 99.0 FL    MCH 31.9 26.0 - 34.0 PG    MCHC 35.1 30.0 - 36.5 g/dL    RDW 12.3 11.5 - 14.5 %    PLATELET 693 048 - 989 K/uL    NEUTROPHILS 77 (H) 32 - 75 %    LYMPHOCYTES 12 12 - 49 %    MONOCYTES 9 5 - 13 %    EOSINOPHILS 2 0 - 7 %    BASOPHILS 0 0 - 1 %    ABS. NEUTROPHILS 6.3 1.8 - 8.0 K/UL    ABS. LYMPHOCYTES 1.0 0.8 - 3.5 K/UL    ABS. MONOCYTES 0.7 0.0 - 1.0 K/UL    ABS. EOSINOPHILS 0.2 0.0 - 0.4 K/UL    ABS. BASOPHILS 0.0 0.0 - 0.1 K/UL   TROPONIN I    Collection Time: 01/18/18  9:29 PM   Result Value Ref Range    Troponin-I, Qt. <0.04 <0.05 ng/mL   NT-PRO BNP    Collection Time: 01/18/18  9:29 PM   Result Value Ref Range    NT pro-BNP 64 0 - 125 PG/ML       Ct Head Wo Cont    Result Date: 1/18/2018  INDICATION: Hypertension, headache. Exam: Noncontrast CT of the brain is performed with 5 mm collimation. CT dose reduction was achieved with the use of the standardized protocol tailored for this examination and automatic exposure control for dose modulation. FINDINGS: There is no acute intracranial hemorrhage, mass, mass effect or herniation. Ventricular system is normal. The gray-white matter differentiation is well-preserved. The mastoid air cells are well pneumatized. IMPRESSION: No acute intracranial hemorrhage, mass or infarct. Xr Chest Port    Result Date: 1/18/2018  INDICATION: Chest pain. Portable AP upright view of the chest. There is no prior study for direct comparison. Cardiomediastinal silhouette is within normal limits. Lungs are clear bilaterally. Pleural spaces are normal. Osseous structures are intact. IMPRESSION: No acute cardiopulmonary disease.

## 2018-01-19 NOTE — ED PROVIDER NOTES
HPI Comments: 79 y.o. male with past medical history significant for HTN, arthritis, hypercholesterolemia, thyroid disease, and knee replacement who presents from home with chief complaint of HTN. The pt had his knee replaced here 3 weeks ago and he has been on a heavy regiment of percocet which have been working well. The pt's knee has been improving so he has started to ween off of the percocet. 3 days ago, he stopped taking it and has possibly been taking his Tramadol. One day ago, he noticed that his blood pressure was elevated and that his L middle finger was twitching. Today at home, he recorded a blood pressure of 047 systolic, he has a generalized HA, and that he feels \"off\". The pt reports that his knees feel fine. The pt denies fever, chills, N/V/D, and CP. There are no other acute medical concerns at this time. Social hx: former smoker, EtOH use  PCP: PROVIDER UNKNOWN    Note written by Tere Belle, as dictated by Junious Boast, MD 9:32 PM      The history is provided by the patient. No  was used. Past Medical History:   Diagnosis Date    Arthritis     Hypercholesteremia     Hypertension     Ill-defined condition     hyperlipidemia    Ill-defined condition 12/06/2017    obesity  BMI= 30.8    Thyroid disease     Pt states he doesn't know why he is taking this medication. He stated he was never told that he had a problem. Taking this medication for 15 years.        Past Surgical History:   Procedure Laterality Date    HX HEENT      eye surgery x2 -once in childhood, again in 2005    HX KNEE REPLACEMENT Right 10/2014    HX TONSILLECTOMY      child         Family History:   Problem Relation Age of Onset   Delayne Mellow Stroke Mother     Hypertension Mother     Alcohol abuse Mother     Alcohol abuse Father        Social History     Social History    Marital status: SINGLE     Spouse name: N/A    Number of children: N/A    Years of education: N/A Occupational History    Not on file. Social History Main Topics    Smoking status: Former Smoker     Packs/day: 2.00     Years: 12.00     Quit date: 12/6/1997    Smokeless tobacco: Never Used    Alcohol use 6.0 oz/week     6 Glasses of wine, 6 Cans of beer per week      Comment: weekly      Drug use: No    Sexual activity: Not on file     Other Topics Concern    Not on file     Social History Narrative         ALLERGIES: Review of patient's allergies indicates no known allergies. Review of Systems   Constitutional: Negative for chills and fever. Cardiovascular: Negative for chest pain. Gastrointestinal: Negative for diarrhea, nausea and vomiting. Neurological: Positive for headaches. All other systems reviewed and are negative. Vitals:    01/18/18 2058   BP: (!) 188/98   Pulse: (!) 104   Resp: 18   Temp: 98.7 °F (37.1 °C)   SpO2: 96%   Weight: 95.3 kg (210 lb)   Height: 5' 10\" (1.778 m)            Physical Exam   Constitutional: He is oriented to person, place, and time. He appears well-developed and well-nourished. No distress. HENT:   Head: Normocephalic and atraumatic. Nose: Nose normal.   Mouth/Throat: Oropharynx is clear and moist.   Cn intact    No facial droop/ slurred speech/ tongue deviation   Eyes: Conjunctivae and EOM are normal. Pupils are equal, round, and reactive to light. Right eye exhibits no discharge. Left eye exhibits no discharge. Neck: Normal range of motion. Neck supple. Cardiovascular: Normal rate, regular rhythm, normal heart sounds and intact distal pulses. Exam reveals no gallop and no friction rub. No murmur heard. Pulmonary/Chest: Effort normal and breath sounds normal. No respiratory distress. He has no wheezes. He has no rales. He exhibits no tenderness. Abdominal: Soft. Bowel sounds are normal. He exhibits no distension and no mass. There is no tenderness. There is no rebound and no guarding.    nttp     Genitourinary:   Genitourinary Comments: Pt denies urinary/ Testicular/ scrotal or penile  complaints   Musculoskeletal: Normal range of motion. He exhibits no edema, tenderness or deformity. Lymphadenopathy:     He has no cervical adenopathy. Neurological: He is alert and oriented to person, place, and time. He has normal reflexes. No cranial nerve deficit. Coordination normal.   pt has motor/ CV/ Sensation grossly intact to all extremities, R = L in strength;   Skin: Skin is warm and dry. No rash noted. No erythema. Psychiatric: He has a normal mood and affect. Nursing note and vitals reviewed. MDM  ED Course       Procedures    Chief Complaint   Patient presents with    Hypertension       10:01 PM  The patients presenting problems have been discussed, and they are in agreement with the care plan formulated and outlined with them. I have encouraged them to ask questions as they arise throughout their visit. MEDICATIONS GIVEN:  Medications   cloNIDine HCl (CATAPRES) tablet 0.2 mg (0.2 mg Oral Given 1/18/18 2133)       LABS REVIEWED:  Labs Reviewed   METABOLIC PANEL, COMPREHENSIVE - Abnormal; Notable for the following:        Result Value    Glucose 133 (*)     BUN/Creatinine ratio 11 (*)     All other components within normal limits   CBC WITH AUTOMATED DIFF - Abnormal; Notable for the following:     NEUTROPHILS 77 (*)     All other components within normal limits   URINALYSIS W/MICROSCOPIC   LIPASE   CK W/ CKMB & INDEX   TROPONIN I   NT-PRO BNP       RADIOLOGY RESULTS:  The following have been ordered and reviewed:  _____________________________________________________________________  _____________________________________________________________________    EKG interpretation: (Preliminary)  Rhythm: normal sinus rhythm; and regular .  Rate (approx.): 94; Axis: normal; P wave: normal; QRS interval: normal ; ST/T wave: normal; Negative acute significant segmental elevations/ unchanged compared to study dated 12/06/2017    PROCEDURES:        CONSULTATIONS:       PROGRESS NOTES:      DIAGNOSIS:    1. Secondary hypertension        PLAN:  1- home      ED COURSE: The patients hospital course has been uncomplicated. 10:26 PM  Valerio Gonzalez Jr.'s  results have been reviewed with him. He has been counseled regarding his diagnosis. He verbally conveys understanding and agreement of the signs, symptoms, diagnosis, treatment and prognosis and additionally agrees to Call/ Arrange follow up as recommended with Dr. Green On File Bsi in 24 - 48 hours. He also agrees with the care-plan and conveys that all of his questions have been answered. I have also put together some discharge instructions for him that include: 1) educational information regarding their diagnosis, 2) how to care for their diagnosis at home, as well a 3) list of reasons why they would want to return to the ED prior to their follow-up appointment, should their condition change or for concerns.

## 2018-01-19 NOTE — ED NOTES
Patient given discharge instruction by Dario Mora. Verbalized understanding, pt discharge home with family.

## 2019-04-22 NOTE — ANESTHESIA PROCEDURE NOTES
Physician Discharge Summary     Patient ID:  Charla Valles  [id number]  34 y.o.  7/18/1931    Admit date: 4/12/2019    Discharge date and time: 4/16/2019  1:47 PM     Admitting Physician: Rachel Keys MD     Discharge Physician: Nandini Duran MD     Admission Diagnoses: Lung nodule [R91.1]    Discharge Diagnoses:  Left lower lobe lung mass, non-small-cell lung  cancer. 1.  Bronchoscopy. 2,  Left robotic VATS. 3.  Robotic lysis of adhesions. 4.  Robotic left lower lobe wedge resection. 5.  Robotic left lower lobectomy. 6.  Mediastinal lymph node dissection. 7. Intercostal nerve block. Discharged Condition: stable    Indication for Admission: This is an 44-year-old man with a history of coronary artery  bypass grafting x6, who was found to have a left lower lobe lung mass. This was in the superior segment. It was enlarging and PET-positive and  looked quite ominous. He was referred for resection. The patient was  described the procedure in full including the risks and complications,  including but not limited to bleeding, infection, need for reoperation,  hemothorax, pneumothorax, stroke, myocardial infarction, and death; and  the patient agreed to proceed. Hospital Course: course as above and on 4/12/19 patient underwent  Bronch/Left robotic VATS/Robotic CURLY/Robotic LLL wedge/Robotic left lower lobectomy/Mediastinal lymph node dissection/Intercostal nerve block. Patient was extubated in the OR, transferred to PACU and then to step down floor. On POD 1 CTs noted to have some airleak, Basilar CT was removed, and remaining CT was cont to [place] system. Patient was on amio for afib prophylaxis and cont to wean on 02. On POD 2 patient cont to habe airleak and CT was cont . On POD 3 CT without airleak and was removed. HTN meds cont to be up-titrated and patient was noted to have increasing o2 requirements and CXR was obtained before and after chest tube removal without significant findings.   On Spinal Block    Start time: 12/13/2017 12:25 PM  End time: 12/13/2017 12:35 PM  Performed by: Stacey Garcia  Authorized by: Shaina Livingston     Pre-procedure:   Indications: at surgeon's request and primary anesthetic  Preanesthetic Checklist: patient identified, risks and benefits discussed, anesthesia consent, site marked, patient being monitored and timeout performed    Timeout Time: 12:25          Spinal Block:   Patient Position:  Seated  Prep Region:  Lumbar  Prep: DuraPrep      Location:  L3-4  Technique:  Single shot        Needle:   Needle Type:  Pencan  Needle Gauge:  25 G  Attempts:  1      Events: CSF confirmed, no blood with aspiration and no paresthesia        Assessment:  Insertion:  Uncomplicated  Patient tolerance:  Patient tolerated the procedure well with no immediate complications POD 4 patient was off o2, hemodynamics were stable, he was ambulating well on RA and DC'd home          Consults: pulm    Discharge Exam:  PE  Cardiac: RRR  Lungs: decreased bases  Chest incision clean. Abd: Soft, +BS  Ext: SWARTZ     Disposition: home    Patient Instructions:   [unfilled]  Activity: activity as tolerated  Diet: cardiac diet   Wound Care: as directed    No current facility-administered medications for this encounter. Current Outpatient Medications:     amiodarone (CORDARONE) 200 MG tablet, Take 1 tablet by mouth See Admin Instructions Take one tablet once daily x 14 days, Disp: 14 tablet, Rfl: 0    metoprolol succinate (TOPROL XL) 25 MG extended release tablet, Take 1.5 tablets by mouth daily, Disp: 30 tablet, Rfl: 3    amLODIPine (NORVASC) 10 MG tablet, Take 1 tablet by mouth daily, Disp: 30 tablet, Rfl: 3    docusate (COLACE, DULCOLAX) 100 MG CAPS, Take 100 mg by mouth 2 times daily as needed (constipation), Disp: 20 capsule, Rfl: 0    oxyCODONE-acetaminophen (PERCOCET) 5-325 MG per tablet, Take 1 tablet by mouth every 6 hours as needed for Pain for up to 7 days. Intended supply: 7 days. Take lowest dose possible to manage pain, Disp: 28 tablet, Rfl: 0    PROAIR  (90 Base) MCG/ACT inhaler, , Disp: , Rfl:     atorvastatin (LIPITOR) 40 MG tablet, Take 40 mg by mouth daily. , Disp: , Rfl:     Calcium-Magnesium-Vitamin D (CALCIUM 1200+D3 PO), Take 1 tablet by mouth daily. , Disp: , Rfl:     Multiple Vitamins-Minerals (CENTRUM-LUTEIN PO), Take 1 tablet by mouth daily. , Disp: , Rfl:     Omega-3 Fatty Acids (FISH OIL) 1000 MG CAPS, Take 1,000 mg by mouth daily. , Disp: , Rfl:     aspirin 81 MG EC tablet, Take 81 mg by mouth daily. , Disp: , Rfl:     Coenzyme Q10 (COQ10) 100 MG CAPS, Take 100 mg by mouth daily.   , Disp: , Rfl:     Bevacizumab (AVASTIN IV), Infuse intravenously See Admin Instructions Gets injection in eyes about every 11 weeks, Disp: , Rfl:       Signed:  Blu Foley Aurea  4/22/2019  9:40 AM

## 2022-03-17 ENCOUNTER — HOSPITAL ENCOUNTER (EMERGENCY)
Age: 72
Discharge: HOME | End: 2022-03-17
Attending: EMERGENCY MEDICINE
Payer: COMMERCIAL

## 2022-03-17 VITALS
SYSTOLIC BLOOD PRESSURE: 150 MMHG | WEIGHT: 208 LBS | TEMPERATURE: 98.3 F | DIASTOLIC BLOOD PRESSURE: 84 MMHG | OXYGEN SATURATION: 93 % | HEIGHT: 70 IN | RESPIRATION RATE: 15 BRPM | HEART RATE: 58 BPM | BODY MASS INDEX: 29.78 KG/M2

## 2022-03-17 DIAGNOSIS — I10 ELEVATED BLOOD PRESSURE READING WITH DIAGNOSIS OF HYPERTENSION: Primary | ICD-10-CM

## 2022-03-17 LAB
ALBUMIN SERPL-MCNC: 3.7 G/DL (ref 3.5–5)
ALBUMIN/GLOB SERPL: 1.1 {RATIO} (ref 1.1–2.2)
ALP SERPL-CCNC: 79 U/L (ref 45–117)
ALT SERPL-CCNC: 35 U/L (ref 12–78)
ANION GAP SERPL CALC-SCNC: 3 MMOL/L (ref 5–15)
AST SERPL-CCNC: 20 U/L (ref 15–37)
BASOPHILS # BLD: 0.1 K/UL (ref 0–0.1)
BASOPHILS NFR BLD: 1 % (ref 0–1)
BILIRUB SERPL-MCNC: 0.3 MG/DL (ref 0.2–1)
BUN SERPL-MCNC: 14 MG/DL (ref 6–20)
BUN/CREAT SERPL: 14 (ref 12–20)
CALCIUM SERPL-MCNC: 9 MG/DL (ref 8.5–10.1)
CHLORIDE SERPL-SCNC: 109 MMOL/L (ref 97–108)
CO2 SERPL-SCNC: 28 MMOL/L (ref 21–32)
CREAT SERPL-MCNC: 0.97 MG/DL (ref 0.7–1.3)
DIFFERENTIAL METHOD BLD: NORMAL
EOSINOPHIL # BLD: 0.3 K/UL (ref 0–0.4)
EOSINOPHIL NFR BLD: 4 % (ref 0–7)
ERYTHROCYTE [DISTWIDTH] IN BLOOD BY AUTOMATED COUNT: 12.2 % (ref 11.5–14.5)
GLOBULIN SER CALC-MCNC: 3.4 G/DL (ref 2–4)
GLUCOSE SERPL-MCNC: 103 MG/DL (ref 65–100)
HCT VFR BLD AUTO: 47.1 % (ref 36.6–50.3)
HGB BLD-MCNC: 16.1 G/DL (ref 12.1–17)
IMM GRANULOCYTES # BLD AUTO: 0 K/UL (ref 0–0.04)
IMM GRANULOCYTES NFR BLD AUTO: 0 % (ref 0–0.5)
LYMPHOCYTES # BLD: 1.2 K/UL (ref 0.8–3.5)
LYMPHOCYTES NFR BLD: 15 % (ref 12–49)
MCH RBC QN AUTO: 32.4 PG (ref 26–34)
MCHC RBC AUTO-ENTMCNC: 34.2 G/DL (ref 30–36.5)
MCV RBC AUTO: 94.8 FL (ref 80–99)
MONOCYTES # BLD: 0.9 K/UL (ref 0–1)
MONOCYTES NFR BLD: 11 % (ref 5–13)
NEUTS SEG # BLD: 5.7 K/UL (ref 1.8–8)
NEUTS SEG NFR BLD: 69 % (ref 32–75)
NRBC # BLD: 0 K/UL (ref 0–0.01)
NRBC BLD-RTO: 0 PER 100 WBC
PLATELET # BLD AUTO: 269 K/UL (ref 150–400)
PMV BLD AUTO: 9.9 FL (ref 8.9–12.9)
POTASSIUM SERPL-SCNC: 4.2 MMOL/L (ref 3.5–5.1)
PROT SERPL-MCNC: 7.1 G/DL (ref 6.4–8.2)
RBC # BLD AUTO: 4.97 M/UL (ref 4.1–5.7)
SODIUM SERPL-SCNC: 140 MMOL/L (ref 136–145)
WBC # BLD AUTO: 8.2 K/UL (ref 4.1–11.1)

## 2022-03-17 PROCEDURE — 93005 ELECTROCARDIOGRAM TRACING: CPT

## 2022-03-17 PROCEDURE — 80053 COMPREHEN METABOLIC PANEL: CPT

## 2022-03-17 PROCEDURE — 36415 COLL VENOUS BLD VENIPUNCTURE: CPT

## 2022-03-17 PROCEDURE — 85025 COMPLETE CBC W/AUTO DIFF WBC: CPT

## 2022-03-17 PROCEDURE — 99284 EMERGENCY DEPT VISIT MOD MDM: CPT

## 2022-03-18 PROBLEM — Z96.659 S/P KNEE REPLACEMENT: Status: ACTIVE | Noted: 2017-12-13

## 2022-03-18 PROBLEM — M17.12 PRIMARY OSTEOARTHRITIS OF LEFT KNEE: Status: ACTIVE | Noted: 2017-12-12

## 2022-03-18 NOTE — DISCHARGE INSTRUCTIONS
You are to continue with current blood pressure medications and follow-up with your primary care doctor

## 2022-03-18 NOTE — ED PROVIDER NOTES
Date of Service:  3/17/2022    Patient:  Sophie Milton Chief Complaint:  Hypertension       HPI:  Sophie Milton is a 70 y.o.  male who presents for evaluation of elevated blood pressure reading that occurred 1 hour prior to arrival arrival to the ER. Patient denies any symptoms. Patient states that he currently takes losartan once a day for hypertension. Patient states that he stopped taking amlodipine for high blood pressure 1 year ago. Past Medical History:   Diagnosis Date    Arthritis     Hypercholesteremia     Hypertension     Ill-defined condition     hyperlipidemia    Ill-defined condition 2017    obesity  BMI= 30.8    Thyroid disease     Pt states he doesn't know why he is taking this medication. He stated he was never told that he had a problem. Taking this medication for 15 years. Past Surgical History:   Procedure Laterality Date    HX HEENT      eye surgery x2 -once in childhood, again in     HX KNEE REPLACEMENT Right 10/2014    HX KNEE REPLACEMENT Left     HX TONSILLECTOMY      child         Family History:   Problem Relation Age of Onset   [facility] Stroke Mother     Hypertension Mother     Alcohol abuse Mother     Alcohol abuse Father        Social History     Socioeconomic History    Marital status: SINGLE     Spouse name: Not on file    Number of children: Not on file    Years of education: Not on file    Highest education level: Not on file   Occupational History    Not on file   Tobacco Use    Smoking status: Former Smoker     Packs/day: 2.00     Years: 12.00     Pack years: 24.00     Quit date: 1997     Years since quittin.2    Smokeless tobacco: Never Used   Substance and Sexual Activity    Alcohol use:  Yes     Alcohol/week: 10.0 standard drinks     Types: 6 Glasses of wine, 6 Cans of beer per week     Comment: weekly      Drug use: No    Sexual activity: Not on file   Other Topics Concern    Not on file   Social History Narrative    Not on file     Social Determinants of Health     Financial Resource Strain:     Difficulty of Paying Living Expenses: Not on file   Food Insecurity:     Worried About Running Out of Food in the Last Year: Not on file    Wanda of Food in the Last Year: Not on file   Transportation Needs:     Lack of Transportation (Medical): Not on file    Lack of Transportation (Non-Medical): Not on file   Physical Activity:     Days of Exercise per Week: Not on file    Minutes of Exercise per Session: Not on file   Stress:     Feeling of Stress : Not on file   Social Connections:     Frequency of Communication with Friends and Family: Not on file    Frequency of Social Gatherings with Friends and Family: Not on file    Attends Rastafari Services: Not on file    Active Member of [address] [place] [address] [facility] or Organizations: Not on file    Attends Club or Organization Meetings: Not on file    Marital Status: Not on file   Intimate Partner Violence:     Fear of Current or Ex-Partner: Not on file    Emotionally Abused: Not on file    Physically Abused: Not on file    Sexually Abused: Not on file   Housing Stability:     Unable to Pay for Housing in the Last Year: Not on file    Number of [place] in the Last Year: Not on file    Unstable Housing in the Last Year: Not on file         ALLERGIES: Patient has no known allergies. Review of Systems   Constitutional: Negative for chills and fever. Eyes: Negative for visual disturbance. Respiratory: Negative for shortness of breath. Cardiovascular: Negative for chest pain. Gastrointestinal: Negative for abdominal pain. Genitourinary: Negative for dysuria. Musculoskeletal: Negative for neck stiffness. Skin: Negative for rash. Allergic/Immunologic: Negative for immunocompromised state. Neurological: Negative for headaches. Psychiatric/Behavioral: Negative for agitation. All other systems reviewed and are negative.       Vitals:    03/17/22 2044   BP: (!) 182/114   Pulse: 68   Resp: 16   Temp: 98.3 °F (36.8 °C)   SpO2: 98%   Weight: 94.3 kg (208 lb)   Height: 5' 10\" (1.778 m)            Physical Exam  Vitals and nursing note reviewed. Constitutional:       General: He is not in acute distress. HENT:      Head: Atraumatic. Eyes:      Conjunctiva/sclera: Conjunctivae normal.   Cardiovascular:      Rate and Rhythm: Normal rate and regular rhythm. Heart sounds: No murmur heard. Pulmonary:      Effort: Pulmonary effort is normal.   Abdominal:      Palpations: Abdomen is soft. Tenderness: There is no abdominal tenderness. Musculoskeletal:         General: Normal range of motion. Cervical back: Normal range of motion. Skin:     General: Skin is warm. Neurological:      Mental Status: He is alert and oriented to person, place, and time. Mental status is at baseline. MDM       Procedures    VITAL SIGNS:  Patient Vitals for the past 4 hrs:   Temp Pulse Resp BP SpO2   03/17/22 2312 -- (!) 58 15 (!) 150/84 93 %   03/17/22 2242 -- 60 16 (!) 160/90 96 %   03/17/22 2235 -- 66 -- -- --   03/17/22 2227 -- -- -- -- 96 %   03/17/22 2227 -- -- -- (!) 172/82 95 %   03/17/22 2044 98.3 °F (36.8 °C) 68 16 (!) 182/114 98 %         LABS:  Recent Results (from the past 6 hour(s))   EKG, 12 LEAD, INITIAL    Collection Time: 03/17/22 10:08 PM   Result Value Ref Range    Ventricular Rate 62 BPM    Atrial Rate 62 BPM    P-R Interval 130 ms    QRS Duration 90 ms    Q-T Interval 416 ms    QTC Calculation (Bezet) 422 ms    Calculated P Axis 62 degrees    Calculated R Axis -11 degrees    Calculated T Axis 46 degrees    Diagnosis       Normal sinus rhythm  Normal ECG  When compared with ECG of 18-JAN-2018 21:22,  Vent.  rate has decreased BY  33 BPM     CBC WITH AUTOMATED DIFF    Collection Time: 03/17/22 10:13 PM   Result Value Ref Range    WBC 8.2 4.1 - 11.1 K/uL    RBC 4.97 4.10 - 5.70 M/uL    HGB 16.1 12.1 - 17.0 g/dL    HCT 47.1 36.6 - 50.3 % MCV 94.8 80.0 - 99.0 FL    MCH 32.4 26.0 - 34.0 PG    MCHC 34.2 30.0 - 36.5 g/dL    RDW 12.2 11.5 - 14.5 %    PLATELET 196 605 - 225 K/uL    MPV 9.9 8.9 - 12.9 FL    NRBC 0.0 0  WBC    ABSOLUTE NRBC 0.00 0.00 - 0.01 K/uL    NEUTROPHILS 69 32 - 75 %    LYMPHOCYTES 15 12 - 49 %    MONOCYTES 11 5 - 13 %    EOSINOPHILS 4 0 - 7 %    BASOPHILS 1 0 - 1 %    IMMATURE GRANULOCYTES 0 0.0 - 0.5 %    ABS. NEUTROPHILS 5.7 1.8 - 8.0 K/UL    ABS. LYMPHOCYTES 1.2 0.8 - 3.5 K/UL    ABS. MONOCYTES 0.9 0.0 - 1.0 K/UL    ABS. EOSINOPHILS 0.3 0.0 - 0.4 K/UL    ABS. BASOPHILS 0.1 0.0 - 0.1 K/UL    ABS. IMM. GRANS. 0.0 0.00 - 0.04 K/UL    DF AUTOMATED     METABOLIC PANEL, COMPREHENSIVE    Collection Time: 03/17/22 10:13 PM   Result Value Ref Range    Sodium 140 136 - 145 mmol/L    Potassium 4.2 3.5 - 5.1 mmol/L    Chloride 109 (H) 97 - 108 mmol/L    CO2 28 21 - 32 mmol/L    Anion gap 3 (L) 5 - 15 mmol/L    Glucose 103 (H) 65 - 100 mg/dL    BUN 14 6 - 20 MG/DL    Creatinine 0.97 0.70 - 1.30 MG/DL    BUN/Creatinine ratio 14 12 - 20      GFR est AA >60 >60 ml/min/1.73m2    GFR est non-AA >60 >60 ml/min/1.73m2    Calcium 9.0 8.5 - 10.1 MG/DL    Bilirubin, total 0.3 0.2 - 1.0 MG/DL    ALT (SGPT) 35 12 - 78 U/L    AST (SGOT) 20 15 - 37 U/L    Alk. phosphatase 79 45 - 117 U/L    Protein, total 7.1 6.4 - 8.2 g/dL    Albumin 3.7 3.5 - 5.0 g/dL    Globulin 3.4 2.0 - 4.0 g/dL    A-G Ratio 1.1 1.1 - 2.2          IMAGING:  No orders to display         Medications During Visit:  Medications - No data to display      DECISION MAKING:  Meka Love is a 70 y.o. male who comes in as above. Work was unremarkable. EKG showed normal sinus rhythm. Results reviewed with the patient. Patient blood pressure 157/88 upon discharge. Patient stable for discharge. Return precautions given to patient. IMPRESSION:  1.  Elevated blood pressure reading with diagnosis of hypertension        DISPOSITION:  Discharged      Current Discharge Medication List           Follow-up Information     Follow up With Specialties Details Why Contact Info    Primary Care Provider  Schedule an appointment as soon as possible for a visit       [facility] EMERGENCY DEPT Emergency Medicine  If symptoms worsen [address] [facility]  [phone]            The patient is asked to follow-up with their primary care provider in the next several days. They are to call tomorrow for an appointment. The patient is asked to return promptly for any increased concerns or worsening of symptoms. They can return to this emergency department or any other emergency department.

## 2022-03-18 NOTE — ED TRIAGE NOTES
Pt states he checked his bp at his friend's house today and his bp was ranging from systolic 059F-[id number]. Pt. Asymptomatic and denies cp/sob/h/a but does endorse recently being sick and having diarrhea for approx. 3 days. Pt has hx of htn.

## 2022-03-19 LAB
ATRIAL RATE: 62 BPM
CALCULATED P AXIS, ECG09: 62 DEGREES
CALCULATED R AXIS, ECG10: -11 DEGREES
CALCULATED T AXIS, ECG11: 46 DEGREES
DIAGNOSIS, 93000: NORMAL
P-R INTERVAL, ECG05: 130 MS
Q-T INTERVAL, ECG07: 416 MS
QRS DURATION, ECG06: 90 MS
QTC CALCULATION (BEZET), ECG08: 422 MS
VENTRICULAR RATE, ECG03: 62 BPM

## 2023-05-20 RX ORDER — ACETAMINOPHEN 500 MG
500-1000 TABLET ORAL EVERY 6 HOURS PRN
COMMUNITY
Start: 2017-12-13

## 2023-05-20 RX ORDER — TRAMADOL HYDROCHLORIDE 50 MG/1
50 TABLET ORAL EVERY 6 HOURS PRN
COMMUNITY
Start: 2017-12-13

## 2023-05-20 RX ORDER — SULINDAC 200 MG/1
200 TABLET ORAL 2 TIMES DAILY
COMMUNITY

## 2023-05-20 RX ORDER — LEVOTHYROXINE SODIUM 0.12 MG/1
125 TABLET ORAL
COMMUNITY

## 2023-05-20 RX ORDER — ATORVASTATIN CALCIUM 80 MG/1
80 TABLET, FILM COATED ORAL
COMMUNITY

## 2023-05-20 RX ORDER — ASPIRIN 325 MG
325 TABLET, DELAYED RELEASE (ENTERIC COATED) ORAL 2 TIMES DAILY
COMMUNITY
Start: 2017-12-13

## 2023-05-20 RX ORDER — AMLODIPINE BESYLATE 10 MG/1
10 TABLET ORAL
COMMUNITY

## 2023-05-20 RX ORDER — LOSARTAN POTASSIUM 50 MG/1
100 TABLET ORAL
COMMUNITY

## 2023-05-20 RX ORDER — ONDANSETRON 8 MG/1
4 TABLET, ORALLY DISINTEGRATING ORAL EVERY 8 HOURS PRN
COMMUNITY
Start: 2017-12-13

## 2024-11-08 ENCOUNTER — HOSPITAL ENCOUNTER (OUTPATIENT)
Facility: HOSPITAL | Age: 74
Discharge: HOME | End: 2024-11-11
Payer: MEDICARE

## 2024-11-08 DIAGNOSIS — M25.562 LEFT KNEE PAIN, UNSPECIFIED CHRONICITY: ICD-10-CM

## 2024-11-08 DIAGNOSIS — M25.362 KNEE GIVES WAY, LEFT: ICD-10-CM

## 2024-11-08 DIAGNOSIS — Z96.652 PRESENCE OF LEFT ARTIFICIAL KNEE JOINT: ICD-10-CM

## 2024-11-08 DIAGNOSIS — M25.562 LEFT MEDIAL KNEE PAIN: ICD-10-CM

## 2024-11-08 PROCEDURE — 73700 CT LOWER EXTREMITY W/O DYE: CPT

## 2025-01-10 ENCOUNTER — HOSPITAL ENCOUNTER (OUTPATIENT)
Facility: HOSPITAL | Age: 75
Discharge: HOME | End: 2025-01-13
Payer: MEDICARE

## 2025-01-10 VITALS
DIASTOLIC BLOOD PRESSURE: 75 MMHG | TEMPERATURE: 98.4 F | RESPIRATION RATE: 16 BRPM | BODY MASS INDEX: 30.03 KG/M2 | SYSTOLIC BLOOD PRESSURE: 148 MMHG | HEART RATE: 62 BPM | HEIGHT: 70 IN | OXYGEN SATURATION: 97 % | WEIGHT: 209.8 LBS

## 2025-01-10 LAB
ABO + RH BLD: NORMAL
ALBUMIN SERPL-MCNC: 4.4 G/DL (ref 3.5–5)
ALBUMIN/GLOB SERPL: 1.4 (ref 1.1–2.2)
ALP SERPL-CCNC: 74 U/L (ref 45–117)
ALT SERPL-CCNC: 25 U/L (ref 12–78)
ANION GAP SERPL CALC-SCNC: 5 MMOL/L (ref 2–12)
APPEARANCE UR: CLEAR
AST SERPL-CCNC: 16 U/L (ref 15–37)
BACTERIA URNS QL MICRO: NEGATIVE /HPF
BASOPHILS # BLD: 0.08 K/UL (ref 0–0.1)
BASOPHILS NFR BLD: 0.9 % (ref 0–1)
BILIRUB SERPL-MCNC: 1 MG/DL (ref 0.2–1)
BILIRUB UR QL: NEGATIVE
BLOOD GROUP ANTIBODIES SERPL: NORMAL
BUN SERPL-MCNC: 12 MG/DL (ref 6–20)
BUN/CREAT SERPL: 12 (ref 12–20)
CALCIUM SERPL-MCNC: 9.2 MG/DL (ref 8.5–10.1)
CHLORIDE SERPL-SCNC: 106 MMOL/L (ref 97–108)
CO2 SERPL-SCNC: 28 MMOL/L (ref 21–32)
COLOR UR: NORMAL
CREAT SERPL-MCNC: 1.02 MG/DL (ref 0.7–1.3)
CRP SERPL-MCNC: <0.29 MG/DL (ref 0–0.3)
DIFFERENTIAL METHOD BLD: ABNORMAL
EKG ATRIAL RATE: 56 BPM
EKG DIAGNOSIS: NORMAL
EKG P AXIS: 71 DEGREES
EKG P-R INTERVAL: 146 MS
EKG Q-T INTERVAL: 420 MS
EKG QRS DURATION: 94 MS
EKG QTC CALCULATION (BAZETT): 405 MS
EKG R AXIS: 2 DEGREES
EKG T AXIS: 61 DEGREES
EKG VENTRICULAR RATE: 56 BPM
EOSINOPHIL # BLD: 0.22 K/UL (ref 0–0.4)
EOSINOPHIL NFR BLD: 2.5 % (ref 0–7)
EPITH CASTS URNS QL MICRO: NORMAL /LPF
ERYTHROCYTE [DISTWIDTH] IN BLOOD BY AUTOMATED COUNT: 12.7 % (ref 11.5–14.5)
ERYTHROCYTE [SEDIMENTATION RATE] IN BLOOD: 3 MM/HR (ref 0–20)
EST. AVERAGE GLUCOSE BLD GHB EST-MCNC: 117 MG/DL
GLOBULIN SER CALC-MCNC: 3.2 G/DL (ref 2–4)
GLUCOSE SERPL-MCNC: 106 MG/DL (ref 65–100)
GLUCOSE UR STRIP.AUTO-MCNC: NEGATIVE MG/DL
HBA1C MFR BLD: 5.7 % (ref 4–5.6)
HCT VFR BLD AUTO: 50.7 % (ref 36.6–50.3)
HGB BLD-MCNC: 16.8 G/DL (ref 12.1–17)
HGB UR QL STRIP: NEGATIVE
HYALINE CASTS URNS QL MICRO: NORMAL /LPF (ref 0–2)
IMM GRANULOCYTES # BLD AUTO: 0.03 K/UL (ref 0–0.04)
IMM GRANULOCYTES NFR BLD AUTO: 0.3 % (ref 0–0.5)
KETONES UR QL STRIP.AUTO: NEGATIVE MG/DL
LEUKOCYTE ESTERASE UR QL STRIP.AUTO: NEGATIVE
LYMPHOCYTES # BLD: 1.14 K/UL (ref 0.8–3.5)
LYMPHOCYTES NFR BLD: 13 % (ref 12–49)
MCH RBC QN AUTO: 31 PG (ref 26–34)
MCHC RBC AUTO-ENTMCNC: 33.1 G/DL (ref 30–36.5)
MCV RBC AUTO: 93.5 FL (ref 80–99)
MONOCYTES # BLD: 0.82 K/UL (ref 0–1)
MONOCYTES NFR BLD: 9.3 % (ref 5–13)
NEUTS SEG # BLD: 6.49 K/UL (ref 1.8–8)
NEUTS SEG NFR BLD: 74 % (ref 32–75)
NITRITE UR QL STRIP.AUTO: NEGATIVE
NRBC # BLD: 0 K/UL (ref 0–0.01)
NRBC BLD-RTO: 0 PER 100 WBC
PH UR STRIP: 5.5 (ref 5–8)
PLATELET # BLD AUTO: 276 K/UL (ref 150–400)
PMV BLD AUTO: 10.7 FL (ref 8.9–12.9)
POTASSIUM SERPL-SCNC: 4.2 MMOL/L (ref 3.5–5.1)
PROT SERPL-MCNC: 7.6 G/DL (ref 6.4–8.2)
PROT UR STRIP-MCNC: NEGATIVE MG/DL
RBC # BLD AUTO: 5.42 M/UL (ref 4.1–5.7)
RBC #/AREA URNS HPF: NORMAL /HPF (ref 0–5)
SODIUM SERPL-SCNC: 139 MMOL/L (ref 136–145)
SP GR UR REFRACTOMETRY: 1.02 (ref 1–1.03)
SPECIMEN EXP DATE BLD: NORMAL
URINE CULTURE IF INDICATED: NORMAL
UROBILINOGEN UR QL STRIP.AUTO: 1 EU/DL (ref 0.2–1)
WBC # BLD AUTO: 8.8 K/UL (ref 4.1–11.1)
WBC URNS QL MICRO: NORMAL /HPF (ref 0–4)

## 2025-01-10 PROCEDURE — 84466 ASSAY OF TRANSFERRIN: CPT

## 2025-01-10 PROCEDURE — 80053 COMPREHEN METABOLIC PANEL: CPT

## 2025-01-10 PROCEDURE — 83036 HEMOGLOBIN GLYCOSYLATED A1C: CPT

## 2025-01-10 PROCEDURE — 36415 COLL VENOUS BLD VENIPUNCTURE: CPT

## 2025-01-10 PROCEDURE — 86901 BLOOD TYPING SEROLOGIC RH(D): CPT

## 2025-01-10 PROCEDURE — 86850 RBC ANTIBODY SCREEN: CPT

## 2025-01-10 PROCEDURE — 93005 ELECTROCARDIOGRAM TRACING: CPT | Performed by: ORTHOPAEDIC SURGERY

## 2025-01-10 PROCEDURE — 86140 C-REACTIVE PROTEIN: CPT

## 2025-01-10 PROCEDURE — 85025 COMPLETE CBC W/AUTO DIFF WBC: CPT

## 2025-01-10 PROCEDURE — 81001 URINALYSIS AUTO W/SCOPE: CPT

## 2025-01-10 PROCEDURE — 86900 BLOOD TYPING SEROLOGIC ABO: CPT

## 2025-01-10 PROCEDURE — 85652 RBC SED RATE AUTOMATED: CPT

## 2025-01-10 PROCEDURE — 93010 ELECTROCARDIOGRAM REPORT: CPT | Performed by: SPECIALIST

## 2025-01-10 RX ORDER — TRAZODONE HYDROCHLORIDE 100 MG/1
100 TABLET ORAL
COMMUNITY

## 2025-01-10 NOTE — DISCHARGE INSTRUCTIONS
[facility][note 3]                   [address] [place] [address]   PRE-ADMISSION TESTING    [phone]     Surgery Date:  Thursday, January 23, 2025       Is surgery arrival time given by surgeon?  YES  NO  If “NO”, [facility] staff will call you between 3 and 7pm the day before your surgery with your arrival time. (If your surgery is on a Monday, we will call you the Friday before.)    Call [phone] after 7pm Monday-Friday if you did not receive this call.    INSTRUCTIONS BEFORE YOUR SURGERY   When You  Arrive Arrive at the 2nd Floor Admitting Desk on the day of your surgery  Have your insurance card, photo ID, and any copayment (if needed)   Food   and   Drink NO food or drink after midnight the night before surgery    This means NO gum, mints, coffee, juice, etc.    You may drink WATER ONLY up until 2 hours prior to your surgery time. Please do not    add anything to your water as this may result in your surgery being postponed.     No alcohol (beer, wine, liquor) 24 hours before and after surgery     Medications to   TAKE   Morning of Surgery MEDICATIONS TO TAKE THE MORNING OF SURGERY WITH WATER:     Levothyroxine  Atorvastatin  Amlodipine  Zyrtec if needed    Do NOT take Losartan on the morning of surgery!     Medications  To  STOP      7 days before surgery Non-Steroidal anti-inflammatory Drugs (NSAID's): for example, Ibuprofen (Advil, Motrin), Naproxen (Aleve)  Aspirin, if taking for pain   Herbal supplements, vitamins, and fish oil  Other: [employer] Fruits [employer] Veggies  (Pain medications not listed above, including Tylenol may be taken)   Blood  Thinners If you take  Aspirin, Plavix, Coumadin, or any blood-thinning or anti-blood clot medicine, talk to the doctor who prescribed the medications for pre-operative instructions.   Bathing Clothing  Jewelry  Valuables     Shower the morning of surgery, please do not apply anything to your skin (lotions, powders, deodorant, or

## 2025-01-11 LAB
BACTERIA SPEC CULT: NORMAL
BACTERIA SPEC CULT: NORMAL
SERVICE CMNT-IMP: NORMAL

## 2025-01-12 LAB — TRANSFERRIN SERPL-MCNC: 283 MG/DL (ref 177–329)

## 2025-01-22 ENCOUNTER — ANESTHESIA EVENT (OUTPATIENT)
Facility: HOSPITAL | Age: 75
End: 2025-01-22
Payer: MEDICARE

## 2025-01-22 NOTE — PERIOP NOTE
Hello,     You are scheduled to have surgery tomorrow at [facility].     We would like for you to arrive at  1130 am  We are located on the second floor, [address]. You will check-in at the registration desk located outside the elevators on the second floor prior to proceeding to [address].  Remember nothing to eat or drink after midnight. If you need to take medications the morning of surgery, please take with a few sips of water.   Wear loose, comfortable clothing and leave all your jewelry at home.   You may bring your cell phone with you.  One family member will be allowed in the pre-op area once you are dressed and your IV has been started.   You will need someone to drive you home and be with you for 24 hours post-anesthesia.     We look forward to seeing you! Call [phone] for questions after hours and [phone] between 5:30AM and 6PM.     Thanks!    [facility] PREOP TEAM

## 2025-01-23 ENCOUNTER — HOSPITAL ENCOUNTER (OUTPATIENT)
Facility: HOSPITAL | Age: 75
Setting detail: OBSERVATION
Discharge: HOME | End: 2025-01-24
Attending: ORTHOPAEDIC SURGERY | Admitting: ORTHOPAEDIC SURGERY
Payer: MEDICARE

## 2025-01-23 ENCOUNTER — ANESTHESIA (OUTPATIENT)
Facility: HOSPITAL | Age: 75
End: 2025-01-23
Payer: MEDICARE

## 2025-01-23 ENCOUNTER — APPOINTMENT (OUTPATIENT)
Facility: HOSPITAL | Age: 75
End: 2025-01-23
Attending: ORTHOPAEDIC SURGERY
Payer: MEDICARE

## 2025-01-23 DIAGNOSIS — M17.12 ARTHRITIS OF LEFT KNEE: Primary | ICD-10-CM

## 2025-01-23 PROCEDURE — C1713 ANCHOR/SCREW BN/BN,TIS/BN: HCPCS | Performed by: ORTHOPAEDIC SURGERY

## 2025-01-23 PROCEDURE — 3700000001 HC ADD 15 MINUTES (ANESTHESIA): Performed by: ORTHOPAEDIC SURGERY

## 2025-01-23 PROCEDURE — 7100000000 HC PACU RECOVERY - FIRST 15 MIN: Performed by: ORTHOPAEDIC SURGERY

## 2025-01-23 PROCEDURE — 2720000010 HC SURG SUPPLY STERILE: Performed by: ORTHOPAEDIC SURGERY

## 2025-01-23 PROCEDURE — 6370000000 HC RX 637 (ALT 250 FOR IP): Performed by: ORTHOPAEDIC SURGERY

## 2025-01-23 PROCEDURE — 2500000003 HC RX 250 WO HCPCS: Performed by: ANESTHESIOLOGY

## 2025-01-23 PROCEDURE — 3600000005 HC SURGERY LEVEL 5 BASE: Performed by: ORTHOPAEDIC SURGERY

## 2025-01-23 PROCEDURE — 2580000003 HC RX 258: Performed by: PHYSICIAN ASSISTANT

## 2025-01-23 PROCEDURE — G0378 HOSPITAL OBSERVATION PER HR: HCPCS

## 2025-01-23 PROCEDURE — 3700000000 HC ANESTHESIA ATTENDED CARE: Performed by: ORTHOPAEDIC SURGERY

## 2025-01-23 PROCEDURE — 2580000003 HC RX 258: Performed by: ANESTHESIOLOGY

## 2025-01-23 PROCEDURE — 7100000001 HC PACU RECOVERY - ADDTL 15 MIN: Performed by: ORTHOPAEDIC SURGERY

## 2025-01-23 PROCEDURE — 6360000002 HC RX W HCPCS: Performed by: ANESTHESIOLOGY

## 2025-01-23 PROCEDURE — 6370000000 HC RX 637 (ALT 250 FOR IP): Performed by: PHYSICIAN ASSISTANT

## 2025-01-23 PROCEDURE — 73560 X-RAY EXAM OF KNEE 1 OR 2: CPT

## 2025-01-23 PROCEDURE — 2500000003 HC RX 250 WO HCPCS: Performed by: ORTHOPAEDIC SURGERY

## 2025-01-23 PROCEDURE — 2500000003 HC RX 250 WO HCPCS: Performed by: PHYSICIAN ASSISTANT

## 2025-01-23 PROCEDURE — 64999 UNLISTED PX NERVOUS SYSTEM: CPT | Performed by: ANESTHESIOLOGY

## 2025-01-23 PROCEDURE — 6360000002 HC RX W HCPCS: Performed by: NURSE ANESTHETIST, CERTIFIED REGISTERED

## 2025-01-23 PROCEDURE — 6370000000 HC RX 637 (ALT 250 FOR IP): Performed by: ANESTHESIOLOGY

## 2025-01-23 PROCEDURE — 3600000015 HC SURGERY LEVEL 5 ADDTL 15MIN: Performed by: ORTHOPAEDIC SURGERY

## 2025-01-23 PROCEDURE — 6360000002 HC RX W HCPCS: Performed by: ORTHOPAEDIC SURGERY

## 2025-01-23 PROCEDURE — C1776 JOINT DEVICE (IMPLANTABLE): HCPCS | Performed by: ORTHOPAEDIC SURGERY

## 2025-01-23 PROCEDURE — 2709999900 HC NON-CHARGEABLE SUPPLY: Performed by: ORTHOPAEDIC SURGERY

## 2025-01-23 PROCEDURE — 6360000002 HC RX W HCPCS: Performed by: PHYSICIAN ASSISTANT

## 2025-01-23 DEVICE — TIBIAL BEARING INSERT - CS
Type: IMPLANTABLE DEVICE | Site: KNEE | Status: FUNCTIONAL
Brand: TRIATHLON

## 2025-01-23 DEVICE — UNIVERSAL TIBIAL BASEPLATE
Type: IMPLANTABLE DEVICE | Site: KNEE | Status: FUNCTIONAL
Brand: TRIATHLON

## 2025-01-23 DEVICE — TIBIAL AUGMENT HALF BLOCK
Type: IMPLANTABLE DEVICE | Site: KNEE | Status: FUNCTIONAL
Brand: TRIATHLON

## 2025-01-23 DEVICE — CRUCIATE RETAINING FEMORAL
Type: IMPLANTABLE DEVICE | Site: KNEE | Status: FUNCTIONAL
Brand: TRIATHLON

## 2025-01-23 DEVICE — SPEEDSET FULL DOSE ANTIBIOTIC BONE CEMENT, 10 PACK CATALOG NUMBER IS 6192-1-010
Type: IMPLANTABLE DEVICE | Site: KNEE | Status: FUNCTIONAL
Brand: SIMPLEX

## 2025-01-23 DEVICE — COMPONENT TOT KNEE CAPPED PRIMARY CEM X3 TRIATHLON: Type: IMPLANTABLE DEVICE | Site: KNEE | Status: FUNCTIONAL

## 2025-01-23 DEVICE — PATELLA
Type: IMPLANTABLE DEVICE | Site: KNEE | Status: FUNCTIONAL
Brand: TRIATHLON

## 2025-01-23 RX ORDER — MIDAZOLAM HYDROCHLORIDE 2 MG/2ML
2 INJECTION, SOLUTION INTRAMUSCULAR; INTRAVENOUS
Status: DISCONTINUED | OUTPATIENT
Start: 2025-01-23 | End: 2025-01-23 | Stop reason: HOSPADM

## 2025-01-23 RX ORDER — FENTANYL CITRATE 50 UG/ML
100 INJECTION, SOLUTION INTRAMUSCULAR; INTRAVENOUS
Status: DISCONTINUED | OUTPATIENT
Start: 2025-01-23 | End: 2025-01-23 | Stop reason: HOSPADM

## 2025-01-23 RX ORDER — TRANEXAMIC ACID 100 MG/ML
INJECTION, SOLUTION INTRAVENOUS
Status: DISCONTINUED | OUTPATIENT
Start: 2025-01-23 | End: 2025-01-23 | Stop reason: SDUPTHER

## 2025-01-23 RX ORDER — ACETAMINOPHEN 325 MG/1
650 TABLET ORAL EVERY 6 HOURS
Status: DISCONTINUED | OUTPATIENT
Start: 2025-01-23 | End: 2025-01-24 | Stop reason: HOSPADM

## 2025-01-23 RX ORDER — MIDAZOLAM HYDROCHLORIDE 1 MG/ML
INJECTION, SOLUTION INTRAMUSCULAR; INTRAVENOUS
Status: DISCONTINUED | OUTPATIENT
Start: 2025-01-23 | End: 2025-01-23 | Stop reason: SDUPTHER

## 2025-01-23 RX ORDER — AMLODIPINE BESYLATE 5 MG/1
10 TABLET ORAL
Status: DISCONTINUED | OUTPATIENT
Start: 2025-01-24 | End: 2025-01-24 | Stop reason: HOSPADM

## 2025-01-23 RX ORDER — SODIUM CHLORIDE, SODIUM LACTATE, POTASSIUM CHLORIDE, CALCIUM CHLORIDE 600; 310; 30; 20 MG/100ML; MG/100ML; MG/100ML; MG/100ML
INJECTION, SOLUTION INTRAVENOUS
Status: DISCONTINUED | OUTPATIENT
Start: 2025-01-23 | End: 2025-01-23 | Stop reason: SDUPTHER

## 2025-01-23 RX ORDER — SODIUM CHLORIDE 9 MG/ML
INJECTION, SOLUTION INTRAVENOUS PRN
Status: DISCONTINUED | OUTPATIENT
Start: 2025-01-23 | End: 2025-01-24 | Stop reason: HOSPADM

## 2025-01-23 RX ORDER — PREGABALIN 75 MG/1
75 CAPSULE ORAL
Status: DISCONTINUED | OUTPATIENT
Start: 2025-01-23 | End: 2025-01-24 | Stop reason: HOSPADM

## 2025-01-23 RX ORDER — OXYCODONE HYDROCHLORIDE 5 MG/1
5 TABLET ORAL EVERY 4 HOURS PRN
Qty: 42 TABLET | Refills: 0 | Status: SHIPPED | OUTPATIENT
Start: 2025-01-23 | End: 2025-01-30

## 2025-01-23 RX ORDER — SODIUM CHLORIDE 0.9 % (FLUSH) 0.9 %
5-40 SYRINGE (ML) INJECTION PRN
Status: DISCONTINUED | OUTPATIENT
Start: 2025-01-23 | End: 2025-01-24 | Stop reason: HOSPADM

## 2025-01-23 RX ORDER — FAMOTIDINE 20 MG/1
20 TABLET, FILM COATED ORAL 2 TIMES DAILY
Status: DISCONTINUED | OUTPATIENT
Start: 2025-01-23 | End: 2025-01-24 | Stop reason: HOSPADM

## 2025-01-23 RX ORDER — ACETAMINOPHEN 325 MG/1
975 TABLET ORAL ONCE
Status: COMPLETED | OUTPATIENT
Start: 2025-01-23 | End: 2025-01-23

## 2025-01-23 RX ORDER — LEVOTHYROXINE SODIUM 125 UG/1
125 TABLET ORAL
Status: DISCONTINUED | OUTPATIENT
Start: 2025-01-24 | End: 2025-01-24 | Stop reason: HOSPADM

## 2025-01-23 RX ORDER — ROPIVACAINE HYDROCHLORIDE 2 MG/ML
INJECTION, SOLUTION EPIDURAL; INFILTRATION; PERINEURAL
Status: DISCONTINUED | OUTPATIENT
Start: 2025-01-23 | End: 2025-01-23 | Stop reason: SDUPTHER

## 2025-01-23 RX ORDER — IBUPROFEN 800 MG/1
800 TABLET, FILM COATED ORAL EVERY 8 HOURS PRN
Qty: 60 TABLET | Refills: 0 | Status: SHIPPED | OUTPATIENT
Start: 2025-01-23

## 2025-01-23 RX ORDER — OXYCODONE HYDROCHLORIDE 5 MG/1
5 TABLET ORAL
Status: DISCONTINUED | OUTPATIENT
Start: 2025-01-23 | End: 2025-01-23 | Stop reason: HOSPADM

## 2025-01-23 RX ORDER — ASPIRIN 81 MG/1
81 TABLET ORAL 2 TIMES DAILY
Status: DISCONTINUED | OUTPATIENT
Start: 2025-01-23 | End: 2025-01-24 | Stop reason: HOSPADM

## 2025-01-23 RX ORDER — BUPIVACAINE HYDROCHLORIDE 5 MG/ML
INJECTION, SOLUTION EPIDURAL; INTRACAUDAL
Status: DISCONTINUED | OUTPATIENT
Start: 2025-01-23 | End: 2025-01-23 | Stop reason: SDUPTHER

## 2025-01-23 RX ORDER — DEXAMETHASONE SODIUM PHOSPHATE 4 MG/ML
INJECTION, SOLUTION INTRA-ARTICULAR; INTRALESIONAL; INTRAMUSCULAR; INTRAVENOUS; SOFT TISSUE
Status: DISCONTINUED | OUTPATIENT
Start: 2025-01-23 | End: 2025-01-23 | Stop reason: SDUPTHER

## 2025-01-23 RX ORDER — OXYCODONE HYDROCHLORIDE 5 MG/1
10 TABLET ORAL
Status: DISCONTINUED | OUTPATIENT
Start: 2025-01-23 | End: 2025-01-24 | Stop reason: HOSPADM

## 2025-01-23 RX ORDER — TRAZODONE HYDROCHLORIDE 100 MG/1
100 TABLET ORAL
Status: DISCONTINUED | OUTPATIENT
Start: 2025-01-23 | End: 2025-01-23

## 2025-01-23 RX ORDER — FENTANYL CITRATE 50 UG/ML
INJECTION, SOLUTION INTRAMUSCULAR; INTRAVENOUS
Status: DISCONTINUED | OUTPATIENT
Start: 2025-01-23 | End: 2025-01-23 | Stop reason: SDUPTHER

## 2025-01-23 RX ORDER — ASPIRIN 81 MG/1
81 TABLET ORAL 2 TIMES DAILY
Qty: 60 TABLET | Refills: 3 | Status: SHIPPED | OUTPATIENT
Start: 2025-01-23

## 2025-01-23 RX ORDER — HYDROMORPHONE HYDROCHLORIDE 1 MG/ML
0.5 INJECTION, SOLUTION INTRAMUSCULAR; INTRAVENOUS; SUBCUTANEOUS
Status: DISCONTINUED | OUTPATIENT
Start: 2025-01-23 | End: 2025-01-24 | Stop reason: HOSPADM

## 2025-01-23 RX ORDER — ATORVASTATIN CALCIUM 20 MG/1
80 TABLET, FILM COATED ORAL
Status: DISCONTINUED | OUTPATIENT
Start: 2025-01-24 | End: 2025-01-24 | Stop reason: HOSPADM

## 2025-01-23 RX ORDER — ONDANSETRON 2 MG/ML
4 INJECTION INTRAMUSCULAR; INTRAVENOUS EVERY 6 HOURS PRN
Status: DISCONTINUED | OUTPATIENT
Start: 2025-01-23 | End: 2025-01-24 | Stop reason: HOSPADM

## 2025-01-23 RX ORDER — POLYETHYLENE GLYCOL 3350 17 G/17G
17 POWDER, FOR SOLUTION ORAL DAILY
Status: DISCONTINUED | OUTPATIENT
Start: 2025-01-23 | End: 2025-01-24 | Stop reason: HOSPADM

## 2025-01-23 RX ORDER — PREGABALIN 75 MG/1
75 CAPSULE ORAL ONCE
Status: COMPLETED | OUTPATIENT
Start: 2025-01-23 | End: 2025-01-23

## 2025-01-23 RX ORDER — DIPHENHYDRAMINE HYDROCHLORIDE 50 MG/ML
12.5 INJECTION INTRAMUSCULAR; INTRAVENOUS
Status: DISCONTINUED | OUTPATIENT
Start: 2025-01-23 | End: 2025-01-23 | Stop reason: HOSPADM

## 2025-01-23 RX ORDER — BISACODYL 10 MG
10 SUPPOSITORY, RECTAL RECTAL DAILY PRN
Status: DISCONTINUED | OUTPATIENT
Start: 2025-01-23 | End: 2025-01-24 | Stop reason: HOSPADM

## 2025-01-23 RX ORDER — SODIUM CHLORIDE, SODIUM LACTATE, POTASSIUM CHLORIDE, CALCIUM CHLORIDE 600; 310; 30; 20 MG/100ML; MG/100ML; MG/100ML; MG/100ML
INJECTION, SOLUTION INTRAVENOUS CONTINUOUS
Status: DISCONTINUED | OUTPATIENT
Start: 2025-01-23 | End: 2025-01-23 | Stop reason: HOSPADM

## 2025-01-23 RX ORDER — LOSARTAN POTASSIUM 50 MG/1
100 TABLET ORAL EVERY MORNING
Status: DISCONTINUED | OUTPATIENT
Start: 2025-01-24 | End: 2025-01-24 | Stop reason: HOSPADM

## 2025-01-23 RX ORDER — ONDANSETRON 4 MG/1
4 TABLET, ORALLY DISINTEGRATING ORAL EVERY 8 HOURS PRN
Qty: 10 TABLET | Refills: 0 | Status: SHIPPED | OUTPATIENT
Start: 2025-01-23

## 2025-01-23 RX ORDER — LIDOCAINE HYDROCHLORIDE 10 MG/ML
1 INJECTION, SOLUTION EPIDURAL; INFILTRATION; INTRACAUDAL; PERINEURAL
Status: DISCONTINUED | OUTPATIENT
Start: 2025-01-23 | End: 2025-01-23 | Stop reason: HOSPADM

## 2025-01-23 RX ORDER — ACETAMINOPHEN 325 MG/1
650 TABLET ORAL EVERY 4 HOURS
Qty: 120 TABLET | Refills: 1 | Status: SHIPPED | OUTPATIENT
Start: 2025-01-23 | End: 2025-02-22

## 2025-01-23 RX ORDER — SODIUM CHLORIDE 0.9 % (FLUSH) 0.9 %
5-40 SYRINGE (ML) INJECTION EVERY 12 HOURS SCHEDULED
Status: DISCONTINUED | OUTPATIENT
Start: 2025-01-23 | End: 2025-01-24 | Stop reason: HOSPADM

## 2025-01-23 RX ORDER — FENTANYL CITRATE 50 UG/ML
25 INJECTION, SOLUTION INTRAMUSCULAR; INTRAVENOUS EVERY 5 MIN PRN
Status: DISCONTINUED | OUTPATIENT
Start: 2025-01-23 | End: 2025-01-23 | Stop reason: HOSPADM

## 2025-01-23 RX ORDER — EPHEDRINE SULFATE/0.9% NACL/PF 25 MG/5 ML
SYRINGE (ML) INTRAVENOUS
Status: DISCONTINUED | OUTPATIENT
Start: 2025-01-23 | End: 2025-01-23 | Stop reason: SDUPTHER

## 2025-01-23 RX ORDER — PROPOFOL 10 MG/ML
INJECTION, EMULSION INTRAVENOUS
Status: DISCONTINUED | OUTPATIENT
Start: 2025-01-23 | End: 2025-01-23 | Stop reason: SDUPTHER

## 2025-01-23 RX ORDER — NALOXONE HYDROCHLORIDE 0.4 MG/ML
INJECTION, SOLUTION INTRAMUSCULAR; INTRAVENOUS; SUBCUTANEOUS PRN
Status: DISCONTINUED | OUTPATIENT
Start: 2025-01-23 | End: 2025-01-23 | Stop reason: HOSPADM

## 2025-01-23 RX ORDER — BISACODYL 5 MG/1
5 TABLET, DELAYED RELEASE ORAL DAILY PRN
Status: DISCONTINUED | OUTPATIENT
Start: 2025-01-23 | End: 2025-01-24 | Stop reason: HOSPADM

## 2025-01-23 RX ORDER — PHENYLEPHRINE HCL IN 0.9% NACL 0.4MG/10ML
SYRINGE (ML) INTRAVENOUS
Status: DISCONTINUED | OUTPATIENT
Start: 2025-01-23 | End: 2025-01-23 | Stop reason: SDUPTHER

## 2025-01-23 RX ORDER — ONDANSETRON 2 MG/ML
4 INJECTION INTRAMUSCULAR; INTRAVENOUS
Status: DISCONTINUED | OUTPATIENT
Start: 2025-01-23 | End: 2025-01-23 | Stop reason: HOSPADM

## 2025-01-23 RX ORDER — HYDROMORPHONE HYDROCHLORIDE 1 MG/ML
1 INJECTION, SOLUTION INTRAMUSCULAR; INTRAVENOUS; SUBCUTANEOUS
Status: DISCONTINUED | OUTPATIENT
Start: 2025-01-23 | End: 2025-01-24 | Stop reason: HOSPADM

## 2025-01-23 RX ORDER — SODIUM CHLORIDE 9 MG/ML
INJECTION, SOLUTION INTRAVENOUS CONTINUOUS
Status: DISCONTINUED | OUTPATIENT
Start: 2025-01-23 | End: 2025-01-23 | Stop reason: HOSPADM

## 2025-01-23 RX ORDER — SODIUM CHLORIDE 9 MG/ML
INJECTION, SOLUTION INTRAVENOUS CONTINUOUS
Status: DISCONTINUED | OUTPATIENT
Start: 2025-01-23 | End: 2025-01-24 | Stop reason: HOSPADM

## 2025-01-23 RX ORDER — DIPHENHYDRAMINE HCL 25 MG
25 CAPSULE ORAL EVERY 6 HOURS PRN
Status: DISCONTINUED | OUTPATIENT
Start: 2025-01-23 | End: 2025-01-24 | Stop reason: HOSPADM

## 2025-01-23 RX ORDER — OXYCODONE HCL 10 MG/1
10 TABLET, FILM COATED, EXTENDED RELEASE ORAL ONCE
Status: COMPLETED | OUTPATIENT
Start: 2025-01-23 | End: 2025-01-23

## 2025-01-23 RX ORDER — OXYCODONE HYDROCHLORIDE 5 MG/1
5 TABLET ORAL
Status: DISCONTINUED | OUTPATIENT
Start: 2025-01-23 | End: 2025-01-24 | Stop reason: HOSPADM

## 2025-01-23 RX ORDER — ONDANSETRON 4 MG/1
4 TABLET, ORALLY DISINTEGRATING ORAL EVERY 8 HOURS PRN
Status: DISCONTINUED | OUTPATIENT
Start: 2025-01-23 | End: 2025-01-24 | Stop reason: HOSPADM

## 2025-01-23 RX ORDER — DIPHENHYDRAMINE HYDROCHLORIDE 50 MG/ML
25 INJECTION INTRAMUSCULAR; INTRAVENOUS EVERY 6 HOURS PRN
Status: DISCONTINUED | OUTPATIENT
Start: 2025-01-23 | End: 2025-01-24 | Stop reason: HOSPADM

## 2025-01-23 RX ADMIN — PROPOFOL 50 MG: 10 INJECTION, EMULSION INTRAVENOUS at 14:44

## 2025-01-23 RX ADMIN — TRANEXAMIC ACID 1000 MG: 100 INJECTION, SOLUTION INTRAVENOUS at 14:50

## 2025-01-23 RX ADMIN — FAMOTIDINE 20 MG: 20 TABLET, FILM COATED ORAL at 20:42

## 2025-01-23 RX ADMIN — ASPIRIN 81 MG: 81 TABLET, COATED ORAL at 20:41

## 2025-01-23 RX ADMIN — Medication 60 MCG: at 16:01

## 2025-01-23 RX ADMIN — ACETAMINOPHEN 975 MG: 325 TABLET ORAL at 12:50

## 2025-01-23 RX ADMIN — SODIUM CHLORIDE: 9 INJECTION, SOLUTION INTRAVENOUS at 19:49

## 2025-01-23 RX ADMIN — FENTANYL CITRATE 100 MCG: 50 INJECTION, SOLUTION INTRAMUSCULAR; INTRAVENOUS at 13:41

## 2025-01-23 RX ADMIN — ROPIVACAINE HYDROCHLORIDE 20 ML: 2 INJECTION, SOLUTION EPIDURAL; INFILTRATION at 13:46

## 2025-01-23 RX ADMIN — Medication 60 MCG: at 15:20

## 2025-01-23 RX ADMIN — WATER 2000 MG: 1 INJECTION INTRAMUSCULAR; INTRAVENOUS; SUBCUTANEOUS at 14:50

## 2025-01-23 RX ADMIN — PREGABALIN 75 MG: 75 CAPSULE ORAL at 12:50

## 2025-01-23 RX ADMIN — SODIUM CHLORIDE: 9 INJECTION, SOLUTION INTRAVENOUS at 12:50

## 2025-01-23 RX ADMIN — WATER 2000 MG: 1 INJECTION INTRAMUSCULAR; INTRAVENOUS; SUBCUTANEOUS at 23:26

## 2025-01-23 RX ADMIN — OXYCODONE HYDROCHLORIDE 10 MG: 10 TABLET, FILM COATED, EXTENDED RELEASE ORAL at 12:50

## 2025-01-23 RX ADMIN — EPHEDRINE SULFATE 10 MG: 5 INJECTION INTRAVENOUS at 15:00

## 2025-01-23 RX ADMIN — PROPOFOL 75 MCG/KG/MIN: 10 INJECTION, EMULSION INTRAVENOUS at 14:45

## 2025-01-23 RX ADMIN — EPHEDRINE SULFATE 10 MG: 5 INJECTION INTRAVENOUS at 14:49

## 2025-01-23 RX ADMIN — POLYETHYLENE GLYCOL 3350 17 G: 17 POWDER, FOR SOLUTION ORAL at 20:42

## 2025-01-23 RX ADMIN — SODIUM CHLORIDE, POTASSIUM CHLORIDE, SODIUM LACTATE AND CALCIUM CHLORIDE: 600; 310; 30; 20 INJECTION, SOLUTION INTRAVENOUS at 15:13

## 2025-01-23 RX ADMIN — SODIUM CHLORIDE, POTASSIUM CHLORIDE, SODIUM LACTATE AND CALCIUM CHLORIDE: 600; 310; 30; 20 INJECTION, SOLUTION INTRAVENOUS at 16:09

## 2025-01-23 RX ADMIN — MIDAZOLAM HYDROCHLORIDE 2 MG: 1 INJECTION, SOLUTION INTRAMUSCULAR; INTRAVENOUS at 13:41

## 2025-01-23 RX ADMIN — DEXAMETHASONE SODIUM PHOSPHATE 8 MG: 4 INJECTION, SOLUTION INTRAMUSCULAR; INTRAVENOUS at 16:38

## 2025-01-23 RX ADMIN — EPHEDRINE SULFATE 10 MG: 5 INJECTION INTRAVENOUS at 15:01

## 2025-01-23 RX ADMIN — SODIUM CHLORIDE, PRESERVATIVE FREE 10 ML: 5 INJECTION INTRAVENOUS at 20:42

## 2025-01-23 RX ADMIN — BUPIVACAINE HYDROCHLORIDE 2.2 ML: 5 INJECTION, SOLUTION EPIDURAL; INTRACAUDAL; PERINEURAL at 14:18

## 2025-01-23 RX ADMIN — PREGABALIN 75 MG: 75 CAPSULE ORAL at 20:41

## 2025-01-23 RX ADMIN — ACETAMINOPHEN 650 MG: 325 TABLET ORAL at 20:42

## 2025-01-23 ASSESSMENT — PAIN SCALES - GENERAL
PAINLEVEL_OUTOF10: 0
PAINLEVEL_OUTOF10: 0

## 2025-01-23 ASSESSMENT — PAIN - FUNCTIONAL ASSESSMENT
PAIN_FUNCTIONAL_ASSESSMENT: PREVENTS OR INTERFERES SOME ACTIVE ACTIVITIES AND ADLS
PAIN_FUNCTIONAL_ASSESSMENT: 0-10

## 2025-01-23 ASSESSMENT — PAIN DESCRIPTION - DESCRIPTORS: DESCRIPTORS: ACHING

## 2025-01-23 ASSESSMENT — PAIN DESCRIPTION - RADICULAR PAIN
RADICULAR_PAIN: ABSENT
RADICULAR_PAIN: ABSENT

## 2025-01-23 NOTE — ANESTHESIA POSTPROCEDURE EVALUATION
Department of Anesthesiology  Postprocedure Note    Patient: Seferino Trevizo Jr.  MRN: [id number]  YOB: 1950  Date of evaluation: 1/23/2025    Procedure Summary       Date: 01/23/25 Room / Location: [facility] ASU OR [address] AMBULATORY OR    Anesthesia Start: 1439 Anesthesia Stop: 1714    Procedure: LEFT KNEE CONVERSION UNILATERAL TO TOTAL KNEE ARTHROPLASTY (JARRELL) (Left: Knee) Diagnosis:       Primary osteoarthritis of left knee      (Primary osteoarthritis of left knee [M17.12])    Surgeons: Vipin Freeman MD Responsible Provider: Sha Medina MD    Anesthesia Type: Spinal ASA Status: 2            Anesthesia Type: Spinal    Aura Phase I: Aura Score: 9    Aura Phase II:      Anesthesia Post Evaluation    Patient location during evaluation: PACU  Patient participation: complete - patient participated  Level of consciousness: awake  Pain score: 0  Airway patency: patent  Nausea & Vomiting: no nausea and no vomiting  Cardiovascular status: blood pressure returned to baseline  Respiratory status: acceptable  Hydration status: euvolemic  Pain management: adequate    No notable events documented.

## 2025-01-23 NOTE — DISCHARGE INSTRUCTIONS
TOTAL KNEE DISCHARGE INSTRUCTIONS      Patient: Seferino Trevizo Jr. MRN: [id number]  SSN: [id number]              Please take the time to review the following instructions before you leave the hospital and use them as guidelines during your recovery from surgery.  If you have any questions you may contact my office at [phone]  After business hours or during the weekend you can contact me through [employer] [Preferred] or text / call at [phone] (cell phone) for emergency's. Please use the office number during regular business hours.    SPECIAL INSTRUCTIONS :   1. Full extension at the knee is the most important aspect of your range of motion. Avoid placing a pillow or bump behind the knee. Rather, place the heel up on a bump or pillow and allow gravity to help straighten the knee.   2. You may weight bear as tolerated on the knee and during the day you should bend the knee as much as possible.   3. Drainage from the incision more than 4 days from surgery is concerning. Contact my office if there is any question [phone].   4. You may contact me directly through [employer] if there are specific questions or text / call using my cell number [phone].      DRESSING :     Post-op Dressings : This should be removed by physical therapy or you may remove this yourself 7 days after the date of your surgery. If there is no drainage, then a simple dressing may be used or no dressing at all. Other dressing options can be purchased over the counter at a local pharmacy or medical supply vendor.        A porous adhesive dressing such as pictured above can be purchased online ([employer]) or at your local [facility] or [email or web address]. You only need to keep the incision covered for 7 days after showers. A dressing may be used for longer if there are issues with clothing clinging to the incision.         Showering/ Bathing:    You may shower with the Post-op dressing in place. This is left in place  everyone has some degree of swelling following surgery.   Following hip and knee replacement surgery, swelling can be normal below the incision for the first few weeks.  This swelling peaks around 5-7 days after surgery.   It is not unusual to have some bruising about the back of the thigh, calf, ankle, and foot.   What should I do for the swelling?  Keep the limb elevated above the level of your heart - 'Toes above Nose'.  Apply compression socks (knee high for total knees and up to the mid-thigh for total hips).   Use the ice packs that you are discharged home with several times a day for the first several weeks.  How long should I remain on blood thinners following surgery?  30 days  Which blood thinners will I be on? Can I take them with Tylenol?   Aspirin 81 mg twice daily - these should be taken with meals and can be used with Tylenol. In certain instances, you may be sent home on Lovenox for 30 days.   For short periods of time (30 days), aspirin and anti-inflammatories (i.e. Aleve, Motrin / Advil / ibuprofen, diclofenac, etc. can be taken together).  When can I drive?  Once you have stopped using regular narcotic pain medications (Oxycodone, Percocet, Lortab, Norco etc.) and can safely apply the brakes without hesitation, (emergency braking).   When can I shower?  You may shower immediately if your Optifoam bandage is dry and without discharge.  The Optifoam dressing should be removed 7 days following surgery, after which you may continue to shower.  No submersion of the incision, bathing or swimming for 14 days following surgery or until cleared by Dr Freeman.  Can I remove this dressing?  Yes, this is removed just like removing a band aid.  If you are concerned, this can be removed by your therapist.   What do I do with the dressing when I shower?  The Optifoam dressing is waterproof and you may shower with it.   The incision is sealed with Dermabond, a biologic skin glue, which also serves as a watertight

## 2025-01-23 NOTE — PERIOP NOTE
TRANSFER - OUT REPORT:    Verbal report given to Amy JOLLY on Seferino Trevizo Jr.  being transferred to [facility] for routine post-op       Report consisted of patient's Situation, Background, Assessment and   Recommendations(SBAR).     Information from the following report(s) Surgery Report was reviewed with the receiving nurse.           Lines:   Peripheral IV 01/23/25 Right Forearm (Active)   Site Assessment Clean, dry & intact 01/23/25 1729   Line Status Infusing 01/23/25 1729   Line Care Connections checked and tightened 01/23/25 1249   Phlebitis Assessment No symptoms 01/23/25 1729   Infiltration Assessment 0 01/23/25 1729   Alcohol Cap Used Yes 01/23/25 1249   Dressing Status Clean, dry & intact 01/23/25 1729   Dressing Type Transparent 01/23/25 1729   Dressing Intervention New 01/23/25 1729        Opportunity for questions and clarification was provided.      Patient transported with:  Registered Nurse

## 2025-01-23 NOTE — OP NOTE
OPERATIVE REPORT     Preoperative Diagnosis: Primary osteoarthritis of left knee [M17.12]  Postoperative Diagnosis: Same    Procedure: Procedure(s):  LEFT KNEE CONVERSION UNILATERAL TO TOTAL KNEE ARTHROPLASTY (JARRELL)  Surgeon: Vipin Freeman MD  Assistant(s): Yossi López PA-C  Anesthesia: Spinal   Estimated Blood Loss: 350cc  Specimens: None  Complications: None       INDICATIONS:   The patient is a 74 y.o., male who has complained of a long history of knee pain. The patient  has failed conservative treatment and presents for definitive operative care. Informed consent obtained including a discussion of the risks and benefits, which include, but are not limited to, bleeding, infection, neurovascular damage, wound complications, pain and stiffness in the knee, periprosthetic loosening, fracture dislocation and DVT, the patient consented for the procedure.     DESCRIPTION OF PROCEDURE:        The patient was seen in the preoperative holding area. The patient was positively identified. The limb was initialed,  questions were answered. The patient was given Ancef preop for an antibiotic. The patient was subsequently taken to the operating room. The patient underwent spinal anesthesia. The patient was positioned in the supine position. All bony prominences were well padded. The limb was prepped and draped in a sterile fashion. The appropriate pause for safety was performed.          A mid-line incision was created. Utilizing an incision from above the superior pole of the patella distally to the tibial tubercle utilizing the previous incision. The incision was taken down through the skin and subcutaneous tissue until the retinaculum could be identified. This was sharply incised utilizing a medial parapatellar incision. The femoral array was placed at the superior portion of the incision. The patella was everted, a planar resurfacing was performed and the drill guide was placed with the appropriate rotation. The

## 2025-01-23 NOTE — ANESTHESIA PROCEDURE NOTES
Spinal Block    End time: 1/23/2025 2:18 PM  Reason for block: primary anesthetic  Staffing  Performed: anesthesiologist   Performed by: Shelton Brenner MD  Authorized by: Shelton Brenner MD    Spinal Block  Prep: DuraPrep  Patient monitoring: cardiac monitor, continuous pulse ox, continuous capnometry, frequent blood pressure checks and oxygen  Approach: midline  Location: L3/L4  Provider prep: mask and sterile gloves  Needle  Needle type: Pencan   Needle gauge: 25 G  Needle length: 3.5 in  Assessment  CSF: clear  Attempts: 1  Hemodynamics: stable  Preanesthetic Checklist  Completed: patient identified, IV checked, site marked, risks and benefits discussed, surgical/procedural consents, equipment checked, pre-op evaluation, timeout performed, anesthesia consent given, oxygen available, monitors applied/VS acknowledged, fire risk safety assessment completed and verbalized and blood product R/B/A discussed and consented

## 2025-01-23 NOTE — ANESTHESIA PRE PROCEDURE
Department of Anesthesiology  Preprocedure Note       Name:  Seferino Trevizo Jr.   Age:  74 y.o.  :  1950                                          MRN:  [id number]         Date:  2025      Surgeon: Surgeon(s):  Vipin Freeman MD    Procedure: Procedure(s):  LEFT KNEE CONVERSION UNILATERAL TO TOTAL KNEE ARTHROPLASTY (JARRELL)    Medications prior to admission:   Prior to Admission medications    Medication Sig Start Date End Date Taking? Authorizing Provider   acetaminophen (TYLENOL) 325 MG tablet Take 2 tablets by mouth every 4 hours 25 Yes Vipin Freeman MD   oxyCODONE (ROXICODONE) 5 MG immediate release tablet Take 1 tablet by mouth every 4 hours as needed for Pain for up to 7 days. Max Daily Amount: 30 mg 25 Yes Vipin Freeman MD   aspirin (ASPIRIN 81) 81 MG EC tablet Take 1 tablet by mouth 2 times daily 25  Yes Vipin Freeman MD   ondansetron (ZOFRAN-ODT) 4 MG disintegrating tablet Take 1 tablet by mouth every 8 hours as needed for Nausea or Vomiting 25  Yes Vipin Freeman MD   ibuprofen (ADVIL;MOTRIN) 800 MG tablet Take 1 tablet by mouth every 8 hours as needed for Pain 25  Yes Vipin Freeman MD   amLODIPine (NORVASC) 10 MG tablet Take 1 tablet by mouth daily (with breakfast)   Yes Automatic Reconciliation, Ar   atorvastatin (LIPITOR) 80 MG tablet Take 1 tablet by mouth every morning (before breakfast)   Yes Automatic Reconciliation, Ar   levothyroxine (SYNTHROID) 125 MCG tablet Take 1 tablet by mouth every morning (before breakfast)   Yes Automatic Reconciliation, Ar   losartan (COZAAR) 100 MG tablet Take 1 tablet by mouth every morning   Yes Automatic Reconciliation, Ar   Cetirizine HCl (ZYRTEC PO) Take by mouth as needed    ProviderQasim MD   NONFORMULARY every morning Balance of [employer]    Qasim Malin MD   traZODone (DESYREL) 100 MG tablet Take 1 tablet by mouth nightly as needed for Sleep    Qasim Malin MD

## 2025-01-23 NOTE — ANESTHESIA PROCEDURE NOTES
Peripheral Block    Patient location during procedure: pre-op  Reason for block: procedure for pain, post-op pain management, primary anesthetic and at surgeon's request  Start time: 1/23/2025 1:41 PM  End time: 1/23/2025 1:46 PM  Staffing  Performed: anesthesiologist   Anesthesiologist: Sha Medina MD  Performed by: Sha Medina MD  Authorized by: Sha Medina MD    Preanesthetic Checklist  Completed: patient identified, IV checked, site marked, risks and benefits discussed, surgical/procedural consents, pre-op evaluation, timeout performed, anesthesia consent given, oxygen available and monitors applied/VS acknowledged  Peripheral Block   Patient position: supine  Prep: ChloraPrep  Provider prep: mask and sterile gloves  Patient monitoring: cardiac monitor, continuous pulse ox, continuous capnometry, frequent blood pressure checks, IV access, oxygen and responsive to questions  Block type: iPacks  Laterality: left  Injection technique: single-shot  Guidance: ultrasound guided    Needle   Needle type: Other   Needle gauge: 21 G  Needle localization: ultrasound guidance  Needle length: 10 cmOther needle type: STIMUPLEX  Assessment   Injection assessment: negative aspiration for heme, no paresthesia on injection, local visualized surrounding nerve on ultrasound and no intravascular symptoms  Hemodynamics: stable  Outcomes: patient tolerated procedure well    Additional Notes  Shonda RN witnessed timeout and block written on correct side.

## 2025-01-24 VITALS
BODY MASS INDEX: 28.88 KG/M2 | RESPIRATION RATE: 16 BRPM | SYSTOLIC BLOOD PRESSURE: 121 MMHG | HEIGHT: 70 IN | OXYGEN SATURATION: 95 % | DIASTOLIC BLOOD PRESSURE: 63 MMHG | HEART RATE: 65 BPM | WEIGHT: 201.72 LBS | TEMPERATURE: 97.7 F

## 2025-01-24 LAB
ANION GAP SERPL CALC-SCNC: 6 MMOL/L (ref 2–12)
BUN SERPL-MCNC: 11 MG/DL (ref 6–20)
BUN/CREAT SERPL: 14 (ref 12–20)
CALCIUM SERPL-MCNC: 8.7 MG/DL (ref 8.5–10.1)
CHLORIDE SERPL-SCNC: 109 MMOL/L (ref 97–108)
CO2 SERPL-SCNC: 23 MMOL/L (ref 21–32)
CREAT SERPL-MCNC: 0.79 MG/DL (ref 0.7–1.3)
GLUCOSE SERPL-MCNC: 126 MG/DL (ref 65–100)
HCT VFR BLD AUTO: 45.4 % (ref 36.6–50.3)
HGB BLD-MCNC: 15.6 G/DL (ref 12.1–17)
POTASSIUM SERPL-SCNC: 4.1 MMOL/L (ref 3.5–5.1)
SODIUM SERPL-SCNC: 138 MMOL/L (ref 136–145)

## 2025-01-24 PROCEDURE — 6370000000 HC RX 637 (ALT 250 FOR IP): Performed by: PHYSICIAN ASSISTANT

## 2025-01-24 PROCEDURE — 36415 COLL VENOUS BLD VENIPUNCTURE: CPT

## 2025-01-24 PROCEDURE — G0378 HOSPITAL OBSERVATION PER HR: HCPCS

## 2025-01-24 PROCEDURE — 97116 GAIT TRAINING THERAPY: CPT

## 2025-01-24 PROCEDURE — 6360000002 HC RX W HCPCS: Performed by: PHYSICIAN ASSISTANT

## 2025-01-24 PROCEDURE — APPNB30 APP NON BILLABLE TIME 0-30 MINS: Performed by: NURSE PRACTITIONER

## 2025-01-24 PROCEDURE — 2500000003 HC RX 250 WO HCPCS: Performed by: PHYSICIAN ASSISTANT

## 2025-01-24 PROCEDURE — 80048 BASIC METABOLIC PNL TOTAL CA: CPT

## 2025-01-24 PROCEDURE — 85018 HEMOGLOBIN: CPT

## 2025-01-24 PROCEDURE — 85014 HEMATOCRIT: CPT

## 2025-01-24 PROCEDURE — 94761 N-INVAS EAR/PLS OXIMETRY MLT: CPT

## 2025-01-24 PROCEDURE — APPNB60 APP NON BILLABLE TIME 46-60 MINS: Performed by: NURSE PRACTITIONER

## 2025-01-24 PROCEDURE — 97161 PT EVAL LOW COMPLEX 20 MIN: CPT

## 2025-01-24 PROCEDURE — 97530 THERAPEUTIC ACTIVITIES: CPT

## 2025-01-24 RX ADMIN — WATER 2000 MG: 1 INJECTION INTRAMUSCULAR; INTRAVENOUS; SUBCUTANEOUS at 06:37

## 2025-01-24 RX ADMIN — ACETAMINOPHEN 650 MG: 325 TABLET ORAL at 02:33

## 2025-01-24 RX ADMIN — AMLODIPINE BESYLATE 10 MG: 5 TABLET ORAL at 09:00

## 2025-01-24 RX ADMIN — ATORVASTATIN CALCIUM 80 MG: 20 TABLET, FILM COATED ORAL at 06:37

## 2025-01-24 RX ADMIN — LOSARTAN POTASSIUM 100 MG: 50 TABLET, FILM COATED ORAL at 09:01

## 2025-01-24 RX ADMIN — ASPIRIN 81 MG: 81 TABLET, COATED ORAL at 09:01

## 2025-01-24 RX ADMIN — SODIUM CHLORIDE, PRESERVATIVE FREE 10 ML: 5 INJECTION INTRAVENOUS at 09:04

## 2025-01-24 RX ADMIN — FAMOTIDINE 20 MG: 20 TABLET, FILM COATED ORAL at 09:01

## 2025-01-24 RX ADMIN — ACETAMINOPHEN 650 MG: 325 TABLET ORAL at 09:01

## 2025-01-24 RX ADMIN — LEVOTHYROXINE SODIUM 125 MCG: 0.12 TABLET ORAL at 06:37

## 2025-01-24 ASSESSMENT — PAIN SCALES - GENERAL
PAINLEVEL_OUTOF10: 1
PAINLEVEL_OUTOF10: 0

## 2025-01-24 ASSESSMENT — PAIN DESCRIPTION - ORIENTATION: ORIENTATION: LEFT

## 2025-01-24 ASSESSMENT — PAIN DESCRIPTION - LOCATION: LOCATION: KNEE

## 2025-01-24 ASSESSMENT — PAIN - FUNCTIONAL ASSESSMENT: PAIN_FUNCTIONAL_ASSESSMENT: ACTIVITIES ARE NOT PREVENTED

## 2025-01-24 ASSESSMENT — PAIN DESCRIPTION - DESCRIPTORS: DESCRIPTORS: ACHING

## 2025-01-24 NOTE — PROGRESS NOTES
Occupational Therapy    Met with patient, who was received completing LB dressing without difficulty- with intact reach and balance.  He demonstrated good understanding of compensatory ADL methods and denied any ADL concerns.  OT evaluation is not indicated.    Heladio Castellanos, OTR

## 2025-01-24 NOTE — PROGRESS NOTES
Orthopaedic Progress Note  Post Op day: 1 Day Post-Op    2025 12:20 PM     Patient: Seferino Trevizo Jr. MRN: [id number]  SSN: [id number]    YOB: 1950  Age: 74 y.o.  Sex: male      Admit date:  2025  Date of Surgery:  [unfilled]   Procedures:  Procedure(s):  LEFT KNEE CONVERSION UNILATERAL TO TOTAL KNEE ARTHROPLASTY (JARRELL)  Admitting Physician:  Vipin Freeman MD   Surgeon:  Surgeons and Role:     * Vipin Freeman MD - Primary    Consulting Physician(s): Treatment Team:   Vipin Freeman MD Kerr, Glenn J, MD Knepp, Mary Cook, RN  Heladio Lai OT Dela Cruz, Alberto E, Emre Alvarez, Silvia Mcdermott    SUBJECTIVE:     Seferino Trevizo Jr. is a 74 y.o. male is 1 Day Post-Op s/p Procedure(s):  LEFT KNEE CONVERSION UNILATERAL TO TOTAL KNEE ARTHROPLASTY (JARRELL) with an appropriate level of post-operative pain.  No complaints of nausea, vomiting, dizziness, lightheadedness, chest pain, or shortness of breath. Patient has been cleared by therapy and feels ready for home.     OBJECTIVE:       Physical Exam:  General: Alert, cooperative, no distress.    Respiratory: Respirations unlabored  Neurological:  Neurovascular exam within normal limits. Motor: EHL/DF/PF 5/5, SILT  Musculoskeletal: Calves soft, supple, non-tender upon palpation.  Dressing/Wound:  Clean, dry and intact. No significant erythema or swelling.      Vital Signs:      Patient Vitals for the past 8 hrs:   BP Temp Temp src Pulse Resp SpO2   25 1128 121/63 97.7 °F (36.5 °C) Oral 65 16 95 %   25 0823 (!) 147/71 97.3 °F (36.3 °C) Oral 57 16 98 %                                          Temp (24hrs), Av.6 °F (36.4 °C), Min:97.2 °F (36.2 °C), Max:98.4 °F (36.9 °C)      Labs:        Recent Labs     25  0553   HCT 45.4   HGB 15.6     Lab Results   Component Value Date/Time     2025 05:53 AM    K 4.1 2025 05:53 AM     2025 05:53 AM     CO2 23 01/24/2025 05:53 AM    BUN 11 01/24/2025 05:53 AM         ASSESSMENT / PLAN:   Principal Problem:    Primary osteoarthritis of left knee  Resolved Problems:    * No resolved hospital problems. *       S/P LEFT KNEE CONVERSION UNILATERAL TO TOTAL KNEE ARTHROPLASTY (JARRELL) PT/OT - WBAT   DVT Prophylaxis ASA bid, frequent ambulation, SCD’s     Pain Scheduled acetaminophen, oxycodone prn     Discharge Disposition Home w/ Family today. Follow up with Dr. Freeman as scheduled.        Signed By: Mary Faust, APRN - NP    RN, MSN, FNP-C  [facility]

## 2025-01-24 NOTE — PROGRESS NOTES
PHYSICAL THERAPY EVALUATION/DISCHARGE    Patient: Seferino Trevizo Jr. (74 y.o. male)  Date: 1/24/2025  Primary Diagnosis: Primary osteoarthritis of left knee [M17.12]  Procedure(s) (LRB):  LEFT KNEE CONVERSION UNILATERAL TO TOTAL KNEE ARTHROPLASTY (JARRELL) (Left) 1 Day Post-Op   Precautions:                        ASSESSMENT AND RECOMMENDATIONS:  Based on the objective data below, the patient modified independent with ambulation using a rolling walker and independent with all functional mobility. Offered to practice stairs patient declined, stated this is his 3rd knee surgery and he knows up with the good and down with the surgical leg. Just like when he had his previous knee surgeries so no need to practice stairs per patient. Reviewed all safety precaution and home exercise program with the patient, verbalized understanding, clear to go home per Physical Therapy perspective. Skilled physical therapy is not indicated at this time.     Functional Outcome Measure:  The patient scored 20/24 on the [facility] outcome measure .    Further skilled acute physical therapy is not indicated at this time.  Patient is cleared for discharge from PT standpoint:  YES [x]     NO []       PLAN :  Recommendation for discharge: (in order for the patient to meet his/her long term goals):   Outpatient physical therapy for TKA    Other factors to consider for discharge: no additional factors    IF patient discharges home will need the following DME: patient owns DME required for discharge       SUBJECTIVE:   Patient stated “ this is my 3rd knee surgery.”    OBJECTIVE DATA SUMMARY:     Past Medical History:   Diagnosis Date    Anxiety     COVID     x2    Hypercholesteremia     Hyperlipidemia     Hypertension     Insomnia     Osteoarthritis     Thyroid disease 2000    Per pt.he doesn't know why he's taking medication;was never told that he had a problem.     Past Surgical History:   Procedure Laterality Date    CATARACT REMOVAL Left     HEENT    Pace decreased (< 100 feet/min)  Step Length: Right shortened;Left shortened  Gait Abnormalities: Path deviations                                                                                                                                                                                                                                                              [facility] AM-PAC®      Basic Mobility Inpatient Short Form (6-Clicks) Version 2  How much HELP from another person do you currently need... (If the patient hasn't done an activity recently, how much help from another person do you think they would need if they tried?) Total A Lot A Little None   1.  Turning from your back to your side while in a flat bed without using bedrails? []  1 []  2 []  3  [x]  4   2.  Moving from lying on your back to sitting on the side of a flat bed without using bedrails? []  1 []  2 []  3  [x]  4   3.  Moving to and from a bed to a chair (including a wheelchair)? []  1 []  2 []  3  [x]  4   4. Standing up from a chair using your arms (e.g. wheelchair or bedside chair)? []  1 []  2 []  3  [x]  4   5.  Walking in hospital room? []  1 []  2 []  3  [x]  4   6.  Climbing 3-5 steps with a railing? []  1 []  2 []  3  []  4     Raw Score: 20/24                            Cutoff score <=171,2,3 had higher odds of discharging home with home health or need of SNF/IPR.    1. Noemy Montenegro, Gwen Morrison, Jesús Harrison, Elicia Menezes, Niall Parrish, Quinn Montenegro.  Validity of the AM-PAC “6-Clicks” Inpatient Daily Activity and Basic Mobility Short Forms. Physical Therapy Mar 2014, [phone]; DOI: 10.2522/ptj.87926049  2. Eitan MATUTE, Sabine J, Kelli J, Eulalia J. Association of AM-PAC \"6-Clicks\" Basic Mobility and Daily Activity Scores With Discharge Destination. Phys Ther. 2021 Apr 4;101(4):cwiv753. doi: 10.1093/ptj/bkdz648. PMID: 11017429.  3. Jeremiah VALENCIA, Tiny ALCANTAR, Michelle S, Jane BUCKLEY, Ba AMBRIZ. Activity

## 2025-01-24 NOTE — CARE COORDINATION
1/24/2025  11:18 AM  Care Management Progress Note    Reason for Admission:   Primary osteoarthritis of left knee [M17.12]  Procedure(s) (LRB):  LEFT KNEE CONVERSION UNILATERAL TO TOTAL KNEE ARTHROPLASTY (JARRELL) (Left)  1 Day Post-Op    Patient Admission Status: Observation  Date Admitted to INP:  [x]NA - OBS/Outpatient  RUR: No data recorded  Hospitalization in the last 30 days (Readmission):  No        Transition of care plan:  Discharge pending therapy clearance.   Home with OP PT - CM confirmed pt is scheduled for OP PT on Tuesday at [facility] office. Pt has a RW for home use.   Date IM given: [x]NA  Outpatient follow-up.  Discharge transport: Family       01/24/25 1118   Social/Functional History   Home Equipment Walker - Rolling   Services At/After Discharge   Services At/After Discharge Outpatient;PT   Mode of Transport at Discharge Other (see comment)   Confirm Follow Up Transport Family

## 2025-01-24 NOTE — PLAN OF CARE
Problem: Safety - Adult  Goal: Free from fall injury  Outcome: Progressing     Problem: ABCDS Injury Assessment  Goal: Absence of physical injury  Outcome: Progressing     Problem: Pain  Goal: Verbalizes/displays adequate comfort level or baseline comfort level  Outcome: Progressing     Problem: Discharge Planning  Goal: Discharge to home or other facility with appropriate resources  Outcome: Progressing     Problem: ABCDS Injury Assessment  Goal: Absence of physical injury  Outcome: Progressing     Problem: Pain  Goal: Verbalizes/displays adequate comfort level or baseline comfort level  Outcome: Progressing

## 2025-01-24 NOTE — PROGRESS NOTES
Rounded on patient  Patient alert and oriented  Patient previously participated to his previous knee replacements. Has patient education book and states information was valuable in preparing for surgery.   Patient states their home space is prepared and safe for recovery.   Reviewed plan of care with patient, including pain management, positioning,  mobility precautions, ice application, leg positioning, exercises and incentive spirometry use.   Reviewed call don't fall expectations.  Opportunity provided for patient to ask questions and provide comments.  Questions answered.  Friend at bedside

## (undated) DEVICE — SYRINGE MED 30ML STD CLR PLAS LUERLOCK TIP N CTRL DISP

## (undated) DEVICE — GLOVE SURG SZ 85 L12IN FNGR THK79MIL GRN LTX FREE

## (undated) DEVICE — KIT TRK KNEE PROC VIZADISC

## (undated) DEVICE — SPONGE GZ W4XL4IN COT 12 PLY TYP VII WVN C FLD DSGN STERILE

## (undated) DEVICE — PIN BNE FIX 4X80MM STRL 2/PK -- MAKO

## (undated) DEVICE — NDL PRT INJ NSAF BLNT 18GX1.5 --

## (undated) DEVICE — TOTAL JOINT-SFMC: Brand: MEDLINE INDUSTRIES, INC.

## (undated) DEVICE — ELECTRODE BLDE L4IN NONINSULATED EDGE

## (undated) DEVICE — LIGHT HANDLE: Brand: DEVON

## (undated) DEVICE — PIN BONE 3.2 X 140MM

## (undated) DEVICE — SOLUTION WND IRRIGATION 450 ML 0.5 PVP-I 0.9 NACL

## (undated) DEVICE — KIT DRP FOR RIO ROBOTIC ARM ASST SYS

## (undated) DEVICE — SOLUTION IV 500ML 0.9% SOD CHL FLX CONT

## (undated) DEVICE — PENCIL SMK EVAC ALL IN 1 DSGN ENH VISIBILITY IMPROVED AIR

## (undated) DEVICE — DRAPE,EXTREMITY,89X128,STERILE: Brand: MEDLINE

## (undated) DEVICE — STERILE POLYISOPRENE POWDER-FREE SURGICAL GLOVES: Brand: PROTEXIS

## (undated) DEVICE — 4.0MM EGG

## (undated) DEVICE — BUR FLUT ANSPACH 6MM BALL STRL --

## (undated) DEVICE — TAPE,CLOTH/SILK,CURAD,3"X10YD,LF,40/CS: Brand: CURAD

## (undated) DEVICE — DEPRESSOR TONGUE 6IN SENIOR -- CONVERT TO ITEM 153408

## (undated) DEVICE — Device

## (undated) DEVICE — HOOD: Brand: FLYTE

## (undated) DEVICE — TUBING IRR HI FLO -- F/ANSPACH EMAX 2

## (undated) DEVICE — GAUZE SPONGES,12 PLY: Brand: CURITY

## (undated) DEVICE — KENDALL SCD EXPRESS SLEEVES, KNEE LENGTH, MEDIUM: Brand: KENDALL SCD

## (undated) DEVICE — SKIN MARKER,REGULAR TIP WITH RULER AND LABELS: Brand: DEVON

## (undated) DEVICE — SUTURE STRATAFIX SYMMETRIC SZ 1 L18IN ABSRB VLT CT1 L36CM SXPP1A404

## (undated) DEVICE — SUTURE STRATAFIX SPRL SZ 1 L14IN ABSRB VLT L48CM CTX 1/2 SXPD2B405

## (undated) DEVICE — Device: Brand: JELCO

## (undated) DEVICE — SUTURE VCRL SZ 2-0 L36IN ABSRB UD L36MM CT-1 1/2 CIR J945H

## (undated) DEVICE — NEEDLE HYPO 22GA L1.5IN BLK S STL HUB POLYPR SHLD REG BVL

## (undated) DEVICE — SUTURE VICRYL SZ 2-0 L36IN ABSRB UD L36MM CT-1 1/2 CIR J945H

## (undated) DEVICE — ZIMMER® STERILE DISPOSABLE TOURNIQUET CUFF WITH PROTECTIVE SLEEVE AND PLC, DUAL PORT, SINGLE BLADDER, 34 IN. (86 CM)

## (undated) DEVICE — 3M™ IOBAN™ 2 ANTIMICROBIAL INCISE DRAPE 6648EZ: Brand: IOBAN™ 2

## (undated) DEVICE — T4 HOOD

## (undated) DEVICE — 1010 S-DRAPE TOWEL DRAPE 10/BX: Brand: STERI-DRAPE™

## (undated) DEVICE — (D)PREP SKN CHLRAPRP APPL 26ML -- CONVERT TO ITEM 371833

## (undated) DEVICE — NEEDLE HYPO 18GA L1.5IN PNK S STL HUB POLYPR SHLD REG BVL

## (undated) DEVICE — KIT PROC KNE TRACKING PK/1 -- VIZADISC MAKO

## (undated) DEVICE — DUAL IRRIGATION ADAPTOR

## (undated) DEVICE — CEMENT MIXING SYSTEM WITH FEMORAL BREAKWAY NOZZLE: Brand: REVOLUTION

## (undated) DEVICE — PREP KIT PEEL PTCH POVIDONE IOD

## (undated) DEVICE — DRESSING ANTIMIC FOAM OPTIFOAM POSTOP ADH 4X14 IN

## (undated) DEVICE — 4-PORT MANIFOLD: Brand: NEPTUNE 2

## (undated) DEVICE — SUTURE VCRL + SZ 1-0 L36IN ABSRB UD CTX 1/2 CIR TAPR PNT VCP977H

## (undated) DEVICE — SOLUTION IRRIG 1000ML STRL H2O USP PLAS POUR BTL

## (undated) DEVICE — SUTURE VICRYL + SZ 1-0 L36IN ABSRB UD CTX 1/2 CIR TAPR PNT VCP977H

## (undated) DEVICE — GLOVE SURG SZ 9 L12IN FNGR THK79MIL GRN LTX FREE

## (undated) DEVICE — SYR LR LCK 1ML GRAD NSAF 30ML --

## (undated) DEVICE — CONTAINER,SPECIMEN,3OZ,OR STRL: Brand: MEDLINE

## (undated) DEVICE — ABDOMINAL PAD: Brand: DERMACEA

## (undated) DEVICE — PIN BNE FIX 4X140MM STRL -- 1EA=2PK MAKO

## (undated) DEVICE — DEVON™ KNEE AND BODY STRAP 60" X 3" (1.5 M X 7.6 CM): Brand: DEVON

## (undated) DEVICE — SUTURE MONOCRYL STRATAFIX SPRL + 3 0 SGL ARMED PS 1 24 IN LEN SXMP1B103

## (undated) DEVICE — DERMABOND SKIN ADH 0.7ML -- DERMABOND ADVANCED 12/BX

## (undated) DEVICE — KIT INT FIX FEM TIB CKPT MAKOPLASTY

## (undated) DEVICE — COVER,MAYO STAND,STERILE: Brand: MEDLINE

## (undated) DEVICE — STERILE POLYISOPRENE POWDER-FREE SURGICAL GLOVES WITH EMOLLIENT COATING: Brand: PROTEXIS

## (undated) DEVICE — STRYKER PERFORMANCE SERIES SAGITTAL BLADE: Brand: STRYKER PERFORMANCE SERIES

## (undated) DEVICE — INFECTION CONTROL KIT SYS

## (undated) DEVICE — SUTURE MCRYL SZ 4-0 L27IN ABSRB UD L19MM PS-2 1/2 CIR PRIM Y426H

## (undated) DEVICE — REM POLYHESIVE ADULT PATIENT RETURN ELECTRODE: Brand: VALLEYLAB

## (undated) DEVICE — SOLUTION IRRIG 3000ML 0.9% SOD CHL USP UROMATIC PLAS CONT

## (undated) DEVICE — CLIP IRR F/MED LNG ATTACH -- EMAX 2 +

## (undated) DEVICE — RUBBERBAND FASTENING W0.25XL3.5IN 5 PER PK

## (undated) DEVICE — GLOVE SURG SZ 85 L12IN FNGR ORTHO 126MIL CRM LTX FREE

## (undated) DEVICE — STERILE HOOK LOCK ELASTIC BANDAGE 6IN X 10 YARD: Brand: HOOK LOCK™

## (undated) DEVICE — PADDING CST 6IN STERILE --

## (undated) DEVICE — HANDPIECE SET WITH COAXIAL HIGH FLOW TIP AND SUCTION TUBE: Brand: INTERPULSE

## (undated) DEVICE — BLADE SAW W9XL25MM THK0.38MM CUT THK0.43MM REPL SAG OSC THN

## (undated) DEVICE — SOLUTION IRRIG 3000ML 0.9% SOD CHL FLX CONT 0797208] ICU MEDICAL INC]

## (undated) DEVICE — GOWN,SIRUS,NONRNF,SETINSLV,2XL,18/CS: Brand: MEDLINE

## (undated) DEVICE — YANKAUER OPEN TIP, NO VENT: Brand: ARGYLE

## (undated) DEVICE — BLADE SURG SAW STD S STL OSC W/ SERR EDGE DISP